# Patient Record
Sex: MALE | Race: WHITE | URBAN - METROPOLITAN AREA
[De-identification: names, ages, dates, MRNs, and addresses within clinical notes are randomized per-mention and may not be internally consistent; named-entity substitution may affect disease eponyms.]

---

## 2017-02-23 ENCOUNTER — OUTPATIENT (OUTPATIENT)
Dept: OUTPATIENT SERVICES | Facility: HOSPITAL | Age: 32
LOS: 1 days | Discharge: HOME | End: 2017-02-23

## 2017-08-23 DIAGNOSIS — F11.20 OPIOID DEPENDENCE, UNCOMPLICATED: ICD-10-CM

## 2017-10-02 ENCOUNTER — APPOINTMENT (OUTPATIENT)
Dept: INTERNAL MEDICINE | Facility: HOSPITAL | Age: 32
End: 2017-10-02

## 2017-10-02 PROBLEM — Z00.00 ENCOUNTER FOR PREVENTIVE HEALTH EXAMINATION: Status: ACTIVE | Noted: 2017-10-02

## 2018-01-11 ENCOUNTER — OUTPATIENT (OUTPATIENT)
Dept: OUTPATIENT SERVICES | Facility: HOSPITAL | Age: 33
LOS: 1 days | Discharge: HOME | End: 2018-01-11

## 2018-01-11 DIAGNOSIS — F11.20 OPIOID DEPENDENCE, UNCOMPLICATED: ICD-10-CM

## 2018-03-12 ENCOUNTER — OUTPATIENT (OUTPATIENT)
Dept: OUTPATIENT SERVICES | Facility: HOSPITAL | Age: 33
LOS: 1 days | Discharge: HOME | End: 2018-03-12

## 2018-03-12 DIAGNOSIS — Z00.8 ENCOUNTER FOR OTHER GENERAL EXAMINATION: ICD-10-CM

## 2018-03-12 DIAGNOSIS — I49.9 CARDIAC ARRHYTHMIA, UNSPECIFIED: ICD-10-CM

## 2018-03-12 LAB
ALBUMIN SERPL ELPH-MCNC: 4.1 G/DL — SIGNIFICANT CHANGE UP (ref 3–5.5)
ALP SERPL-CCNC: 53 U/L — SIGNIFICANT CHANGE UP (ref 30–115)
ALT FLD-CCNC: 15 U/L — SIGNIFICANT CHANGE UP (ref 0–41)
ANION GAP SERPL CALC-SCNC: 7 MMOL/L — SIGNIFICANT CHANGE UP (ref 7–14)
APPEARANCE UR: CLEAR — SIGNIFICANT CHANGE UP
AST SERPL-CCNC: 18 U/L — SIGNIFICANT CHANGE UP (ref 0–41)
BILIRUB DIRECT SERPL-MCNC: <0.1 MG/DL — SIGNIFICANT CHANGE UP (ref 0–0.2)
BILIRUB INDIRECT FLD-MCNC: >0.3 MG/DL — SIGNIFICANT CHANGE UP
BILIRUB SERPL-MCNC: 0.4 MG/DL — SIGNIFICANT CHANGE UP (ref 0.2–1.2)
BILIRUB UR-MCNC: NEGATIVE — SIGNIFICANT CHANGE UP
BUN SERPL-MCNC: 17 MG/DL — SIGNIFICANT CHANGE UP (ref 10–20)
CALCIUM SERPL-MCNC: 9.5 MG/DL — SIGNIFICANT CHANGE UP (ref 8.5–10.1)
CHLORIDE SERPL-SCNC: 103 MMOL/L — SIGNIFICANT CHANGE UP (ref 98–110)
CHOLEST SERPL-MCNC: 134 MG/DL — SIGNIFICANT CHANGE UP (ref 100–200)
CO2 SERPL-SCNC: 30 MMOL/L — SIGNIFICANT CHANGE UP (ref 17–32)
COLOR SPEC: YELLOW — SIGNIFICANT CHANGE UP
COMMENT - URINE: SIGNIFICANT CHANGE UP
CREAT SERPL-MCNC: 0.6 MG/DL — LOW (ref 0.7–1.5)
DIFF PNL FLD: NEGATIVE — SIGNIFICANT CHANGE UP
EPI CELLS # UR: (no result) /HPF
ESTIMATED AVERAGE GLUCOSE: 105 MG/DL — SIGNIFICANT CHANGE UP (ref 68–114)
GLUCOSE SERPL-MCNC: 78 MG/DL — SIGNIFICANT CHANGE UP (ref 70–110)
GLUCOSE UR QL: NEGATIVE MG/DL — SIGNIFICANT CHANGE UP
HBA1C BLD-MCNC: 5.3 % — SIGNIFICANT CHANGE UP (ref 4–5.6)
HCT VFR BLD CALC: 37.2 % — LOW (ref 42–52)
HDLC SERPL-MCNC: 38 MG/DL — LOW (ref 40–60)
HGB BLD-MCNC: 12.4 G/DL — LOW (ref 14–18)
KETONES UR-MCNC: NEGATIVE — SIGNIFICANT CHANGE UP
LEUKOCYTE ESTERASE UR-ACNC: NEGATIVE — SIGNIFICANT CHANGE UP
LIPID PNL WITH DIRECT LDL SERPL: 95 MG/DL — SIGNIFICANT CHANGE UP (ref 50–100)
MAGNESIUM SERPL-MCNC: 1.9 MG/DL — SIGNIFICANT CHANGE UP (ref 1.8–2.4)
MCHC RBC-ENTMCNC: 31.1 PG — HIGH (ref 27–31)
MCHC RBC-ENTMCNC: 33.3 G/DL — SIGNIFICANT CHANGE UP (ref 32–37)
MCV RBC AUTO: 93.2 FL — SIGNIFICANT CHANGE UP (ref 80–94)
NITRITE UR-MCNC: NEGATIVE — SIGNIFICANT CHANGE UP
NRBC # BLD: 0 /100 WBCS — SIGNIFICANT CHANGE UP (ref 0–0)
PH UR: 5.5 — SIGNIFICANT CHANGE UP (ref 5–8)
PLATELET # BLD AUTO: 244 K/UL — SIGNIFICANT CHANGE UP (ref 130–400)
POTASSIUM SERPL-MCNC: 4.4 MMOL/L — SIGNIFICANT CHANGE UP (ref 3.5–5)
POTASSIUM SERPL-SCNC: 4.4 MMOL/L — SIGNIFICANT CHANGE UP (ref 3.5–5)
PROT SERPL-MCNC: 7.1 G/DL — SIGNIFICANT CHANGE UP (ref 6–8)
PROT UR-MCNC: 30 MG/DL
RBC # BLD: 3.99 M/UL — LOW (ref 4.7–6.1)
RBC # FLD: 13.3 % — SIGNIFICANT CHANGE UP (ref 11.5–14.5)
RBC CASTS # UR COMP ASSIST: SIGNIFICANT CHANGE UP /HPF
SODIUM SERPL-SCNC: 140 MMOL/L — SIGNIFICANT CHANGE UP (ref 135–146)
SP GR SPEC: >=1.03 (ref 1.01–1.03)
TOTAL CHOLESTEROL/HDL RATIO MEASUREMENT: 3.5 RATIO — LOW (ref 4–5.5)
TRIGL SERPL-MCNC: 82 MG/DL — SIGNIFICANT CHANGE UP (ref 40–150)
UROBILINOGEN FLD QL: 0.2 MG/DL — SIGNIFICANT CHANGE UP (ref 0.2–0.2)
WBC # BLD: 7.62 K/UL — SIGNIFICANT CHANGE UP (ref 4.8–10.8)
WBC # FLD AUTO: 7.62 K/UL — SIGNIFICANT CHANGE UP (ref 4.8–10.8)
WBC UR QL: SIGNIFICANT CHANGE UP /HPF

## 2018-03-13 LAB
HAV IGM SER-ACNC: SIGNIFICANT CHANGE UP
HBV CORE IGM SER-ACNC: SIGNIFICANT CHANGE UP
HBV SURFACE AG SER-ACNC: SIGNIFICANT CHANGE UP
HCV AB S/CO SERPL IA: 0.2 S/CO — SIGNIFICANT CHANGE UP
HCV AB SERPL-IMP: SIGNIFICANT CHANGE UP
T PALLIDUM AB TITR SER: NEGATIVE — SIGNIFICANT CHANGE UP

## 2018-04-19 ENCOUNTER — OUTPATIENT (OUTPATIENT)
Dept: OUTPATIENT SERVICES | Facility: HOSPITAL | Age: 33
LOS: 1 days | Discharge: HOME | End: 2018-04-19

## 2018-04-19 DIAGNOSIS — I49.9 CARDIAC ARRHYTHMIA, UNSPECIFIED: ICD-10-CM

## 2018-06-03 ENCOUNTER — INPATIENT (INPATIENT)
Facility: HOSPITAL | Age: 33
LOS: 2 days | Discharge: HOME | End: 2018-06-06
Attending: HOSPITALIST | Admitting: HOSPITALIST

## 2018-06-03 VITALS
RESPIRATION RATE: 17 BRPM | TEMPERATURE: 100 F | DIASTOLIC BLOOD PRESSURE: 72 MMHG | SYSTOLIC BLOOD PRESSURE: 115 MMHG | WEIGHT: 139.99 LBS | HEART RATE: 87 BPM | OXYGEN SATURATION: 95 % | HEIGHT: 66 IN

## 2018-06-03 LAB
ALBUMIN SERPL ELPH-MCNC: 4.5 G/DL — SIGNIFICANT CHANGE UP (ref 3.5–5.2)
ALP SERPL-CCNC: 116 U/L — HIGH (ref 30–115)
ALT FLD-CCNC: 267 U/L — HIGH (ref 0–41)
ANION GAP SERPL CALC-SCNC: 17 MMOL/L — HIGH (ref 7–14)
APAP SERPL-MCNC: <5 UG/ML — LOW (ref 10–30)
AST SERPL-CCNC: 565 U/L — HIGH (ref 0–41)
BASOPHILS # BLD AUTO: 0.02 K/UL — SIGNIFICANT CHANGE UP (ref 0–0.2)
BASOPHILS NFR BLD AUTO: 0.2 % — SIGNIFICANT CHANGE UP (ref 0–1)
BILIRUB DIRECT SERPL-MCNC: 0.3 MG/DL — HIGH (ref 0–0.2)
BILIRUB INDIRECT FLD-MCNC: 0.6 MG/DL — SIGNIFICANT CHANGE UP (ref 0.2–1.2)
BILIRUB SERPL-MCNC: 0.9 MG/DL — SIGNIFICANT CHANGE UP (ref 0.2–1.2)
BUN SERPL-MCNC: 19 MG/DL — SIGNIFICANT CHANGE UP (ref 10–20)
CALCIUM SERPL-MCNC: 9.4 MG/DL — SIGNIFICANT CHANGE UP (ref 8.5–10.1)
CHLORIDE SERPL-SCNC: 101 MMOL/L — SIGNIFICANT CHANGE UP (ref 98–110)
CK MB CFR SERPL CALC: 2.7 NG/ML — SIGNIFICANT CHANGE UP (ref 0.6–6.3)
CK SERPL-CCNC: 713 U/L — HIGH (ref 0–225)
CO2 SERPL-SCNC: 23 MMOL/L — SIGNIFICANT CHANGE UP (ref 17–32)
CREAT SERPL-MCNC: 0.9 MG/DL — SIGNIFICANT CHANGE UP (ref 0.7–1.5)
D DIMER BLD IA.RAPID-MCNC: 4789 NG/ML DDU — HIGH (ref 0–230)
EOSINOPHIL # BLD AUTO: 0.01 K/UL — SIGNIFICANT CHANGE UP (ref 0–0.7)
EOSINOPHIL NFR BLD AUTO: 0.1 % — SIGNIFICANT CHANGE UP (ref 0–8)
GLUCOSE SERPL-MCNC: 82 MG/DL — SIGNIFICANT CHANGE UP (ref 70–99)
HCT VFR BLD CALC: 38.4 % — LOW (ref 42–52)
HGB BLD-MCNC: 13 G/DL — LOW (ref 14–18)
IMM GRANULOCYTES NFR BLD AUTO: 0.2 % — SIGNIFICANT CHANGE UP (ref 0.1–0.3)
LYMPHOCYTES # BLD AUTO: 0.27 K/UL — LOW (ref 1.2–3.4)
LYMPHOCYTES # BLD AUTO: 3.1 % — LOW (ref 20.5–51.1)
MCHC RBC-ENTMCNC: 30.2 PG — SIGNIFICANT CHANGE UP (ref 27–31)
MCHC RBC-ENTMCNC: 33.9 G/DL — SIGNIFICANT CHANGE UP (ref 32–37)
MCV RBC AUTO: 89.1 FL — SIGNIFICANT CHANGE UP (ref 80–94)
MONOCYTES # BLD AUTO: 0.02 K/UL — LOW (ref 0.1–0.6)
MONOCYTES NFR BLD AUTO: 0.2 % — LOW (ref 1.7–9.3)
NEUTROPHILS # BLD AUTO: 8.26 K/UL — HIGH (ref 1.4–6.5)
NEUTROPHILS NFR BLD AUTO: 96.2 % — HIGH (ref 42.2–75.2)
NRBC # BLD: 0 /100 WBCS — SIGNIFICANT CHANGE UP (ref 0–0)
PLATELET # BLD AUTO: 213 K/UL — SIGNIFICANT CHANGE UP (ref 130–400)
POTASSIUM SERPL-MCNC: 4.8 MMOL/L — SIGNIFICANT CHANGE UP (ref 3.5–5)
POTASSIUM SERPL-SCNC: 4.8 MMOL/L — SIGNIFICANT CHANGE UP (ref 3.5–5)
PROT SERPL-MCNC: 7.3 G/DL — SIGNIFICANT CHANGE UP (ref 6–8)
RBC # BLD: 4.31 M/UL — LOW (ref 4.7–6.1)
RBC # FLD: 13 % — SIGNIFICANT CHANGE UP (ref 11.5–14.5)
SODIUM SERPL-SCNC: 141 MMOL/L — SIGNIFICANT CHANGE UP (ref 135–146)
TROPONIN T SERPL-MCNC: <0.01 NG/ML — SIGNIFICANT CHANGE UP
WBC # BLD: 8.6 K/UL — SIGNIFICANT CHANGE UP (ref 4.8–10.8)
WBC # FLD AUTO: 8.6 K/UL — SIGNIFICANT CHANGE UP (ref 4.8–10.8)

## 2018-06-03 RX ORDER — SODIUM CHLORIDE 9 MG/ML
1000 INJECTION INTRAMUSCULAR; INTRAVENOUS; SUBCUTANEOUS ONCE
Qty: 0 | Refills: 0 | Status: COMPLETED | OUTPATIENT
Start: 2018-06-03 | End: 2018-06-03

## 2018-06-03 RX ORDER — SODIUM CHLORIDE 9 MG/ML
1000 INJECTION INTRAMUSCULAR; INTRAVENOUS; SUBCUTANEOUS
Qty: 0 | Refills: 0 | Status: COMPLETED | OUTPATIENT
Start: 2018-06-03 | End: 2018-06-04

## 2018-06-03 RX ORDER — METOCLOPRAMIDE HCL 10 MG
10 TABLET ORAL ONCE
Qty: 0 | Refills: 0 | Status: COMPLETED | OUTPATIENT
Start: 2018-06-03 | End: 2018-06-03

## 2018-06-03 RX ORDER — ACETAMINOPHEN 500 MG
975 TABLET ORAL ONCE
Qty: 0 | Refills: 0 | Status: COMPLETED | OUTPATIENT
Start: 2018-06-03 | End: 2018-06-03

## 2018-06-03 RX ORDER — METOCLOPRAMIDE HCL 10 MG
10 TABLET ORAL ONCE
Qty: 0 | Refills: 0 | Status: DISCONTINUED | OUTPATIENT
Start: 2018-06-03 | End: 2018-06-03

## 2018-06-03 RX ORDER — SODIUM CHLORIDE 9 MG/ML
2000 INJECTION INTRAMUSCULAR; INTRAVENOUS; SUBCUTANEOUS ONCE
Qty: 0 | Refills: 0 | Status: DISCONTINUED | OUTPATIENT
Start: 2018-06-03 | End: 2018-06-06

## 2018-06-03 RX ADMIN — Medication 10 MILLIGRAM(S): at 21:19

## 2018-06-03 RX ADMIN — SODIUM CHLORIDE 1000 MILLILITER(S): 9 INJECTION INTRAMUSCULAR; INTRAVENOUS; SUBCUTANEOUS at 22:49

## 2018-06-03 RX ADMIN — Medication 975 MILLIGRAM(S): at 21:20

## 2018-06-03 NOTE — ED PROVIDER NOTE - PHYSICAL EXAMINATION
VITAL SIGNS: I have reviewed nursing notes and confirm.  CONSTITUTIONAL: Well-developed; well-nourished; in no acute distress.  SKIN: Skin exam is warm and dry, no acute rash.  HEAD: Normocephalic; atraumatic.  EYES: PERRL, EOM intact; conjunctiva and sclera clear.  ENT: No nasal discharge; airway clear.   NECK: Supple; non tender.  CARD: S1, S2 normal; no murmurs, gallops, or rubs. Regular rate and rhythm. (+) mild left lower lateral rib TTP.   RESP: No wheezes, rales or rhonchi. Speaking in full sentences.   ABD: Normal bowel sounds; soft; non-distended; non-tender; No rebound or guarding. No CVA tenderness.   EXT: Normal ROM. No clubbing, cyanosis or edema.  NEURO: Alert, oriented. Grossly unremarkable. No focal deficits. CN II-XII intact. No dysmetria. No ataxia. Sensation intact throughout. Strength 5/5 throughout. Gait steady.

## 2018-06-03 NOTE — ED PROVIDER NOTE - CARE PLAN
Principal Discharge DX:	Transaminitis  Secondary Diagnosis:	Headache  Secondary Diagnosis:	Chest pain

## 2018-06-03 NOTE — ED PROVIDER NOTE - OBJECTIVE STATEMENT
34 yo M with PMHx of opioid abuse on methadone presents to the ED c/o headache and left sided chest pain. Headache started today around 4pm while pt was at rest and has been persisting. Pt tried taking Advil without relief of symptoms. Pt denies hx of similar headaches in the past. Pt also complains of intermittent left sided chest pain x 1 week associated with mild SOB. Pt denies hx of similar symptoms in the past. Pt denies fever, chills, nausea, vomiting, abdominal pain, diarrhea, headache, dizziness, weakness, changes in vision, back pain, LOC, trauma, urinary symptoms, cough, calf pain/swelling, recent travel, recent surgery.

## 2018-06-03 NOTE — ED PROVIDER NOTE - ATTENDING CONTRIBUTION TO CARE
Pt is a 34yo male who comes in for 3d of L chest pain and SOB intermittently for 3d.  No cough.  Reports some headache today with diffuse myalgias.  No vomiting or diarrhea.    Exam: L chest wall and LUQ soreness, soft abdmoen, clear lungs, no LE edema, no calf tenderness, no murmur  Imp: r/o ACS, PE, PNA  Plan: labs, iamging, IVF

## 2018-06-03 NOTE — H&P ADULT - NSHPLABSRESULTS_GEN_ALL_CORE
< from: CT Head No Cont (06.03.18 @ 20:58) >      EXAM:  CT BRAIN            PROCEDURE DATE:  06/03/2018      Impression:     No evidence of acute intracranial pathology.    RACHID KHANNA M.D., RESIDENT RADIOLOGIST  This document has been electronically signed.  ALEENA WEST M.D., ATTENDING RADIOLOGIST  This document has been electronically signed. Zach  3 2018  9:19PM      < end of copied text >    < from: CT Chest w/ IV Cont (06.03.18 @ 20:59) >    EXAM:  CT CHEST IC          PROCEDURE DATE:  06/03/2018      IMPRESSION:     No pulmonary embolus.    Dr. Rachid Khanna discussed preliminary findings with LISETH NINA   on 6/3/2018 9:14 PM with readback.    Few areas of peribronchiolar faint groundglass opacification in bilateral   lower lobes which may reflect small airways infection or inflammation. No   focal consolidation or focal infiltrate.      RACHID KHANNA M.D., RESIDENT RADIOLOGIST  This document has been electronically signed.  ALEENA WEST M.D., ATTENDING RADIOLOGIST  This document has been electronically signed. Zach  3 2018  9:28PM      < end of copied text >                          13.0   8.60  )-----------( 213      ( 03 Jun 2018 20:31 )             38.4     06-03    141  |  101  |  19  ----------------------------<  82  4.8   |  23  |  0.9    Ca    9.4      03 Jun 2018 20:31    TPro  7.3  /  Alb  4.5  /  TBili  0.9  /  DBili  0.3<H>  /  AST  565<H>  /  ALT  267<H>  /  AlkPhos  116<H>  06-03              Lactate Trend    CARDIAC MARKERS ( 03 Jun 2018 20:31 )  x     / <0.01 ng/mL / 713 U/L / x     / 2.7 ng/mL      CAPILLARY BLOOD GLUCOSE

## 2018-06-03 NOTE — H&P ADULT - HISTORY OF PRESENT ILLNESS
[age] yo male presents to the ER due to "flu-like" symptoms. Patient reports that in fact he has had intermittent chest pains for 2 weeks. However today at work he developed a "migraine" headache that he reports "crippled me" The headache is now "much better" however he then developed body aches, soreness and a  pale complexion and in general felt as if a "Aleksandr-truck" hit him (but no fevers) so he came to the ER. While in the ER Patient's blood pressure was low but he was again feeling better-he received iv Fluid bolus. He is urinating and there were no mental status changes-I saw him there. As a result he was advised by staff that if he left the building (ie to go outside to smoke) it would be "against medical advise" and he would be responsible if something happens to him outside, I agreed. Separately during my exam I found 2 syringe needles in his pocket. Upon my request he went to the needle container in his room to throw them out. Patient reports that while at [facility] sometime before 2016, he was told he didn't need the hepatitis vaccine because he was already exposed to Hepatitis B.

## 2018-06-03 NOTE — H&P ADULT - PROBLEM SELECTOR PLAN 1
concerned about viral hepatitis (history of hepatitis B exposure)-will order hepatitis panel along with liver sonogram and GI consult

## 2018-06-04 DIAGNOSIS — R10.13 EPIGASTRIC PAIN: ICD-10-CM

## 2018-06-04 DIAGNOSIS — R74.0 NONSPECIFIC ELEVATION OF LEVELS OF TRANSAMINASE AND LACTIC ACID DEHYDROGENASE [LDH]: ICD-10-CM

## 2018-06-04 DIAGNOSIS — G89.4 CHRONIC PAIN SYNDROME: ICD-10-CM

## 2018-06-04 DIAGNOSIS — F19.10 OTHER PSYCHOACTIVE SUBSTANCE ABUSE, UNCOMPLICATED: ICD-10-CM

## 2018-06-04 DIAGNOSIS — R51 HEADACHE: ICD-10-CM

## 2018-06-04 DIAGNOSIS — I95.9 HYPOTENSION, UNSPECIFIED: ICD-10-CM

## 2018-06-04 DIAGNOSIS — Z71.6 TOBACCO ABUSE COUNSELING: ICD-10-CM

## 2018-06-04 LAB
ALBUMIN SERPL ELPH-MCNC: 3.3 G/DL — LOW (ref 3.5–5.2)
ALP SERPL-CCNC: 74 U/L — SIGNIFICANT CHANGE UP (ref 30–115)
ALT FLD-CCNC: 206 U/L — HIGH (ref 0–41)
ANION GAP SERPL CALC-SCNC: 15 MMOL/L — HIGH (ref 7–14)
APTT BLD: 29.7 SEC — SIGNIFICANT CHANGE UP (ref 27–39.2)
AST SERPL-CCNC: 176 U/L — HIGH (ref 0–41)
BASE EXCESS BLDV CALC-SCNC: 0.6 MMOL/L — SIGNIFICANT CHANGE UP (ref -2–2)
BILIRUB SERPL-MCNC: 0.2 MG/DL — SIGNIFICANT CHANGE UP (ref 0.2–1.2)
BUN SERPL-MCNC: 20 MG/DL — SIGNIFICANT CHANGE UP (ref 10–20)
CA-I SERPL-SCNC: 1.15 MMOL/L — SIGNIFICANT CHANGE UP (ref 1.12–1.3)
CALCIUM SERPL-MCNC: 7.8 MG/DL — LOW (ref 8.5–10.1)
CHLORIDE SERPL-SCNC: 103 MMOL/L — SIGNIFICANT CHANGE UP (ref 98–110)
CK SERPL-CCNC: 128 U/L — SIGNIFICANT CHANGE UP (ref 0–225)
CO2 SERPL-SCNC: 22 MMOL/L — SIGNIFICANT CHANGE UP (ref 17–32)
CREAT SERPL-MCNC: 0.8 MG/DL — SIGNIFICANT CHANGE UP (ref 0.7–1.5)
GAS PNL BLDV: 134 MMOL/L — LOW (ref 136–145)
GAS PNL BLDV: SIGNIFICANT CHANGE UP
GLUCOSE SERPL-MCNC: 94 MG/DL — SIGNIFICANT CHANGE UP (ref 70–99)
HCO3 BLDV-SCNC: 27 MMOL/L — SIGNIFICANT CHANGE UP (ref 22–29)
HCT VFR BLDA CALC: 71.1 % — HIGH (ref 34–44)
HGB BLD CALC-MCNC: 23.2 G/DL — CRITICAL HIGH (ref 14–18)
HOROWITZ INDEX BLDV+IHG-RTO: 21 — SIGNIFICANT CHANGE UP
INR BLD: 1.41 RATIO — HIGH (ref 0.65–1.3)
LACTATE BLDV-MCNC: 1.5 MMOL/L — SIGNIFICANT CHANGE UP (ref 0.5–1.6)
PCO2 BLDV: 48 MMHG — SIGNIFICANT CHANGE UP (ref 41–51)
PH BLDV: 7.36 — SIGNIFICANT CHANGE UP (ref 7.26–7.43)
PO2 BLDV: 24 MMHG — SIGNIFICANT CHANGE UP (ref 20–40)
POTASSIUM BLDV-SCNC: 4.3 MMOL/L — SIGNIFICANT CHANGE UP (ref 3.3–5.6)
POTASSIUM SERPL-MCNC: 4.1 MMOL/L — SIGNIFICANT CHANGE UP (ref 3.5–5)
POTASSIUM SERPL-SCNC: 4.1 MMOL/L — SIGNIFICANT CHANGE UP (ref 3.5–5)
PROT SERPL-MCNC: 5.2 G/DL — LOW (ref 6–8)
PROTHROM AB SERPL-ACNC: 15.3 SEC — HIGH (ref 9.95–12.87)
SAO2 % BLDV: 40 % — SIGNIFICANT CHANGE UP
SODIUM SERPL-SCNC: 140 MMOL/L — SIGNIFICANT CHANGE UP (ref 135–146)

## 2018-06-04 RX ORDER — METHADONE HYDROCHLORIDE 40 MG/1
70 TABLET ORAL DAILY
Qty: 0 | Refills: 0 | Status: DISCONTINUED | OUTPATIENT
Start: 2018-06-04 | End: 2018-06-04

## 2018-06-04 RX ORDER — METHADONE HYDROCHLORIDE 40 MG/1
70 TABLET ORAL
Qty: 0 | Refills: 0 | Status: DISCONTINUED | OUTPATIENT
Start: 2018-06-04 | End: 2018-06-05

## 2018-06-04 RX ORDER — PANTOPRAZOLE SODIUM 20 MG/1
40 TABLET, DELAYED RELEASE ORAL
Qty: 0 | Refills: 0 | Status: DISCONTINUED | OUTPATIENT
Start: 2018-06-04 | End: 2018-06-06

## 2018-06-04 RX ORDER — METHOCARBAMOL 500 MG/1
750 TABLET, FILM COATED ORAL
Qty: 0 | Refills: 0 | Status: DISCONTINUED | OUTPATIENT
Start: 2018-06-04 | End: 2018-06-06

## 2018-06-04 RX ADMIN — SODIUM CHLORIDE 75 MILLILITER(S): 9 INJECTION INTRAMUSCULAR; INTRAVENOUS; SUBCUTANEOUS at 06:52

## 2018-06-04 RX ADMIN — PANTOPRAZOLE SODIUM 40 MILLIGRAM(S): 20 TABLET, DELAYED RELEASE ORAL at 15:45

## 2018-06-04 RX ADMIN — METHOCARBAMOL 750 MILLIGRAM(S): 500 TABLET, FILM COATED ORAL at 15:48

## 2018-06-04 NOTE — CONSULT NOTE ADULT - SUBJECTIVE AND OBJECTIVE BOX
Gastroenterology Consult    33y yo Male with abnormal liver functions.  HPI:  34 yo male presents to the ER due to "flu-like" symptoms. Patient reports that in fact he has had intermittent chest pains for 2 weeks. However today at work he developed a "migraine" headache that he reports "crippled me" The headache is now "much better" however he then developed body aches, soreness and a  pale complexion and in general felt as if a "Aleksandr-truck" hit him (but no fevers) so he came to the ER. While in the ER Patient's blood pressure was low but he was again feeling better-he received iv Fluid bolus. He is urinating and there were no mental status changes-I saw him there. As a result he was advised by staff that if he left the building (ie to go outside to smoke) it would be "against medical advise" and he would be responsible if something happens to him outside, I agreed. Separately during my exam I found 2 syringe needles in his pocket. Upon my request he went to the needle container in his room to throw them out. Patient reports that while at [facility] sometime before 2016, he was told he didn't need the hepatitis vaccine because he was already exposed to Hepatitis B. (03 Jun 2018 22:02)      PAST MEDICAL & SURGICAL HISTORY:  Substance abuse      Allergies; No Known Allergies      Medications: methadone   Dispersible 70 milliGRAM(s) Oral <User Schedule>  methocarbamol 750 milliGRAM(s) Oral two times a day  pantoprazole    Tablet 40 milliGRAM(s) Oral before breakfast  sodium chloride 0.9% Bolus 2000 milliLiter(s) IV Bolus once      Review of Systems: noncontributory, other than in inital H & P    Physical Examination:  T(C): 36.4 (06-04-18 @ 14:18), Max: 37.5 (06-03-18 @ 19:42)  HR: 86 (06-04-18 @ 14:18) (86 - 102)  BP: 99/57 (06-04-18 @ 14:18) (89/55 - 115/72)  RR: 18 (06-04-18 @ 14:18) (17 - 20)  SpO2: 99% (06-04-18 @ 00:51) (95% - 99%)  GENERAL:  Appears stated age, well-groomed, well-nourished, no distress  HEENT:  NC/AT,  conjunctivae clear and pink, no thyromegaly, nodules, adenopathy, no JVD, sclera -anicteric  CHEST:  Full & symmetric excursion, no increased effort, breath sounds clear  HEART:  Regular rhythm, S1, S2, no murmur/rub/S3/S4, no abdominal bruit, no edema  ABDOMEN:  Soft, non-tender, non-distended, normoactive bowel sounds,  no masses ,no hepato-splenomegaly, no signs of chronic liver disease: liver is enlarged  EXTREMITIES:  no cyanosis,clubbing or edema  SKIN:  No rash/erythema/ecchymoses/petechiae/wounds/abscess/warm/dry  NEURO:  Alert, oriented, no asterixis, no tremor, no encephalopathy

## 2018-06-04 NOTE — PROGRESS NOTE ADULT - PROBLEM SELECTOR PLAN 1
check hepatitis panel; GI consult pending; avoid hepatotoxic meds ; pt was consulted regarding Tylenol overdose

## 2018-06-04 NOTE — PROGRESS NOTE ADULT - ASSESSMENT
34 yo male presents to the ER due to "flu-like" symptoms. Patient reports that in fact he has had intermittent chest pains for 2 weeks. However today at work he developed a "migraine" headache that he reports "crippled me" The headache is now "much better" however he then developed body aches, soreness and a  pale complexion and in general felt as if a "Aleksandr-truck" hit him (but no fevers) so he came to the ER. While in the ER Patient's blood pressure was low but he was again feeling better-he received iv Fluid bolus.   Pt is on Methadone for h/o drug abuse and chronic pain. He usually takes 4 Tylenol pill when he is in pain ( 500 mg each)  I educated him regarding possible overdose. Today he left the hospital and went to Methadone clinic ( he took  Methadone from with clinic and came back to the hospital ). Will check hepatitis panel and f/u GI recommendations.   Anticipate discharge in AM

## 2018-06-04 NOTE — PROVIDER CONTACT NOTE (MEDICATION) - SITUATION
Pt was TASHI, called Methadone center and pt was there 10 minutes prior to call and was medicated by there staff. They are now aware pt in inpatient status. Pt also removed self from IV fluids,

## 2018-06-04 NOTE — CONSULT NOTE ADULT - ASSESSMENT
Impression: 33y yo Male with abnormal liver functions.  Differential includes viral hepatitis or toxins related to IV drug abuse.  He notes recent IV drug use.  Recommendation:  Check Hep A Ab, Hep A Igm Hep B SAg, , Hep C Ab, Hep C RNA by PCR, Hep B viral load,LLOYD, Smooth Muscle antibody, LLOYD, Ceruloplasmin, Ferritin, Transferrin Saturation, SPEP, EBV panel  Obtain Ultrasound of Abdomen  Follow CMP and INR

## 2018-06-04 NOTE — PROGRESS NOTE ADULT - SUBJECTIVE AND OBJECTIVE BOX
34 yo male presents to the ER due to "flu-like" symptoms. Patient reports that in fact he has had intermittent chest pains for 2 weeks. However today at work he developed a "migraine" headache that he reports "crippled me" The headache is now "much better" however he then developed body aches, soreness and a  pale complexion and in general felt as if a "Aleksandr-truck" hit him (but no fevers) so he came to the ER. While in the ER Patient's blood pressure was low but he was again feeling better-he received iv Fluid bolus. Pt is on Methadone for h/o drug abuse and chronic pain. He usually takes 4 Tylenol pill when he is in pain ( 500 mg each)  I educated him regarding possible overdose. Today he left the hospital and went to Methadone clinic ( he took  Methadone from with clinic and came back to the hospital )  He is c/o epigastric pain today and chronic neck and lower back pain.     Vital Signs Last 24 Hrs  T(C): 36.7 (04 Jun 2018 02:13), Max: 37.5 (03 Jun 2018 19:42)  T(F): 98.1 (04 Jun 2018 02:13), Max: 99.5 (03 Jun 2018 19:42)  HR: 92 (04 Jun 2018 02:13) (87 - 102)  BP: 99/60 (04 Jun 2018 02:13) (89/55 - 115/72)  -RR: 18 (04 Jun 2018 02:13) (17 - 20)  SpO2: 99% (04 Jun 2018 00:51) (95% - 99%)  PHYSICAL EXAM:  GENERAL: NAD, well-groomed, well-developed  HEAD:  Atraumatic, Normocephalic  EYES: EOMI, PERRLA, conjunctiva and sclera clear  ENMT: No tonsillar erythema, exudates, or enlargement; Moist mucous membranes, Good dentition, No lesions  NECK: Supple, No JVD, Normal thyroid  NERVOUS SYSTEM:  Alert & Oriented X3, Good concentration; Motor Strength 5/5 B/L upper and lower extremities; DTRs 2+ intact and symmetric  CHEST/LUNG: Clear to percussion bilaterally; No rales, rhonchi, wheezing, or rubs  HEART: Regular rate and rhythm; No murmurs, rubs, or gallops  ABDOMEN: soft; epigastric tenderness noted on deep palpation; no rebound; no guarding; BS present   EXTREMITIES:  2+ Peripheral Pulses, No clubbing, cyanosis, or edema  LYMPH: No lymphadenopathy noted  SKIN: No rashes or lesions  LABS:                        13.0   8.60  )-----------( 213      ( 03 Jun 2018 20:31 )             38.4     06-04    140  |  103  |  20  ----------------------------<  94  4.1   |  22  |  0.8    Ca    7.8<L>      04 Jun 2018 07:29    TPro  5.2<L>  /  Alb  3.3<L>  /  TBili  0.2  /  DBili  x   /  AST  176<H>  /  ALT  206<H>  /  AlkPhos  74  06-04    PT/INR - ( 04 Jun 2018 07:29 )   PT: 15.30 sec;   INR: 1.41 ratio         PTT - ( 04 Jun 2018 07:29 )  PTT:29.7 sec    RADIOLOGY & ADDITIONAL TESTS:  < from: US Abdomen Limited (06.04.18 @ 10:03) >  IMPRESSION:  Unremarkable right upper quadrant ultrasound.  Gallbladder polyp, 0.4 cm.    < from: CT Chest w/ IV Cont (06.03.18 @ 20:59) >  IMPRESSION:     No pulmonary embolus.    < from: CT Head No Cont (06.03.18 @ 20:58) >  Impression:     No evidence of acute intracranial pathology.    MEDICATIONS  (STANDING):  methadone   Dispersible 70 milliGRAM(s) Oral <User Schedule>  sodium chloride 0.9% Bolus 2000 milliLiter(s) IV Bolus once

## 2018-06-05 ENCOUNTER — TRANSCRIPTION ENCOUNTER (OUTPATIENT)
Age: 33
End: 2018-06-05

## 2018-06-05 DIAGNOSIS — D72.829 ELEVATED WHITE BLOOD CELL COUNT, UNSPECIFIED: ICD-10-CM

## 2018-06-05 LAB
ALBUMIN SERPL ELPH-MCNC: 3.9 G/DL — SIGNIFICANT CHANGE UP (ref 3.5–5.2)
ALP SERPL-CCNC: 125 U/L — HIGH (ref 30–115)
ALT FLD-CCNC: 147 U/L — HIGH (ref 0–41)
ANION GAP SERPL CALC-SCNC: 11 MMOL/L — SIGNIFICANT CHANGE UP (ref 7–14)
APPEARANCE UR: CLEAR — SIGNIFICANT CHANGE UP
AST SERPL-CCNC: 76 U/L — HIGH (ref 0–41)
BILIRUB SERPL-MCNC: 0.2 MG/DL — SIGNIFICANT CHANGE UP (ref 0.2–1.2)
BILIRUB UR-MCNC: NEGATIVE — SIGNIFICANT CHANGE UP
BUN SERPL-MCNC: 16 MG/DL — SIGNIFICANT CHANGE UP (ref 10–20)
CALCIUM SERPL-MCNC: 8.7 MG/DL — SIGNIFICANT CHANGE UP (ref 8.5–10.1)
CHLORIDE SERPL-SCNC: 103 MMOL/L — SIGNIFICANT CHANGE UP (ref 98–110)
CO2 SERPL-SCNC: 25 MMOL/L — SIGNIFICANT CHANGE UP (ref 17–32)
COLOR SPEC: YELLOW — SIGNIFICANT CHANGE UP
CREAT SERPL-MCNC: 0.9 MG/DL — SIGNIFICANT CHANGE UP (ref 0.7–1.5)
DIFF PNL FLD: NEGATIVE — SIGNIFICANT CHANGE UP
GLUCOSE SERPL-MCNC: 110 MG/DL — HIGH (ref 70–99)
GLUCOSE UR QL: NEGATIVE MG/DL — SIGNIFICANT CHANGE UP
HAV IGM SER-ACNC: SIGNIFICANT CHANGE UP
HAV IGM SER-ACNC: SIGNIFICANT CHANGE UP
HBV CORE IGM SER-ACNC: SIGNIFICANT CHANGE UP
HBV CORE IGM SER-ACNC: SIGNIFICANT CHANGE UP
HBV SURFACE AG SER-ACNC: SIGNIFICANT CHANGE UP
HBV SURFACE AG SER-ACNC: SIGNIFICANT CHANGE UP
HCT VFR BLD CALC: 32 % — LOW (ref 42–52)
HCT VFR BLD CALC: 34.5 % — LOW (ref 42–52)
HCV AB S/CO SERPL IA: 0.13 S/CO — SIGNIFICANT CHANGE UP
HCV AB S/CO SERPL IA: 0.16 S/CO — SIGNIFICANT CHANGE UP
HCV AB SERPL-IMP: SIGNIFICANT CHANGE UP
HCV AB SERPL-IMP: SIGNIFICANT CHANGE UP
HGB BLD-MCNC: 11 G/DL — LOW (ref 14–18)
HGB BLD-MCNC: 11.8 G/DL — LOW (ref 14–18)
KETONES UR-MCNC: NEGATIVE — SIGNIFICANT CHANGE UP
LEUKOCYTE ESTERASE UR-ACNC: NEGATIVE — SIGNIFICANT CHANGE UP
MAGNESIUM SERPL-MCNC: 2 MG/DL — SIGNIFICANT CHANGE UP (ref 1.8–2.4)
MCHC RBC-ENTMCNC: 30.6 PG — SIGNIFICANT CHANGE UP (ref 27–31)
MCHC RBC-ENTMCNC: 31.2 PG — HIGH (ref 27–31)
MCHC RBC-ENTMCNC: 34.2 G/DL — SIGNIFICANT CHANGE UP (ref 32–37)
MCHC RBC-ENTMCNC: 34.4 G/DL — SIGNIFICANT CHANGE UP (ref 32–37)
MCV RBC AUTO: 89.4 FL — SIGNIFICANT CHANGE UP (ref 80–94)
MCV RBC AUTO: 90.7 FL — SIGNIFICANT CHANGE UP (ref 80–94)
NITRITE UR-MCNC: NEGATIVE — SIGNIFICANT CHANGE UP
NRBC # BLD: 0 /100 WBCS — SIGNIFICANT CHANGE UP (ref 0–0)
NRBC # BLD: 0 /100 WBCS — SIGNIFICANT CHANGE UP (ref 0–0)
PH UR: 6.5 — SIGNIFICANT CHANGE UP (ref 5–8)
PLATELET # BLD AUTO: 156 K/UL — SIGNIFICANT CHANGE UP (ref 130–400)
PLATELET # BLD AUTO: 181 K/UL — SIGNIFICANT CHANGE UP (ref 130–400)
POTASSIUM SERPL-MCNC: 3.7 MMOL/L — SIGNIFICANT CHANGE UP (ref 3.5–5)
POTASSIUM SERPL-SCNC: 3.7 MMOL/L — SIGNIFICANT CHANGE UP (ref 3.5–5)
PROT SERPL-MCNC: 6 G/DL — SIGNIFICANT CHANGE UP (ref 6–8)
PROT UR-MCNC: NEGATIVE MG/DL — SIGNIFICANT CHANGE UP
RBC # BLD: 3.53 M/UL — LOW (ref 4.7–6.1)
RBC # BLD: 3.86 M/UL — LOW (ref 4.7–6.1)
RBC # FLD: 13.4 % — SIGNIFICANT CHANGE UP (ref 11.5–14.5)
RBC # FLD: 13.5 % — SIGNIFICANT CHANGE UP (ref 11.5–14.5)
SODIUM SERPL-SCNC: 139 MMOL/L — SIGNIFICANT CHANGE UP (ref 135–146)
SP GR SPEC: 1.02 — SIGNIFICANT CHANGE UP (ref 1.01–1.03)
UROBILINOGEN FLD QL: 1 MG/DL (ref 0.2–0.2)
WBC # BLD: 16.94 K/UL — HIGH (ref 4.8–10.8)
WBC # BLD: 17.94 K/UL — HIGH (ref 4.8–10.8)
WBC # FLD AUTO: 16.94 K/UL — HIGH (ref 4.8–10.8)
WBC # FLD AUTO: 17.94 K/UL — HIGH (ref 4.8–10.8)

## 2018-06-05 RX ORDER — METHADONE HYDROCHLORIDE 40 MG/1
70 TABLET ORAL DAILY
Qty: 0 | Refills: 0 | Status: DISCONTINUED | OUTPATIENT
Start: 2018-06-05 | End: 2018-06-06

## 2018-06-05 RX ORDER — METHOCARBAMOL 500 MG/1
1 TABLET, FILM COATED ORAL
Qty: 30 | Refills: 0
Start: 2018-06-05 | End: 2018-06-19

## 2018-06-05 RX ORDER — PANTOPRAZOLE SODIUM 20 MG/1
1 TABLET, DELAYED RELEASE ORAL
Qty: 30 | Refills: 0
Start: 2018-06-05 | End: 2018-07-04

## 2018-06-05 RX ORDER — METHADONE HYDROCHLORIDE 40 MG/1
7 TABLET ORAL
Qty: 0 | Refills: 0 | DISCHARGE
Start: 2018-06-05

## 2018-06-05 RX ADMIN — METHOCARBAMOL 750 MILLIGRAM(S): 500 TABLET, FILM COATED ORAL at 17:47

## 2018-06-05 RX ADMIN — METHOCARBAMOL 750 MILLIGRAM(S): 500 TABLET, FILM COATED ORAL at 05:48

## 2018-06-05 RX ADMIN — METHADONE HYDROCHLORIDE 70 MILLIGRAM(S): 40 TABLET ORAL at 12:05

## 2018-06-05 RX ADMIN — PANTOPRAZOLE SODIUM 40 MILLIGRAM(S): 20 TABLET, DELAYED RELEASE ORAL at 05:47

## 2018-06-05 NOTE — PROGRESS NOTE ADULT - ASSESSMENT
34 yo male presents to the ER due to "flu-like" symptoms. Patient reports that in fact he has had intermittent chest pains for 2 weeks. However today at work he developed a "migraine" headache that he reports "crippled me" The headache is now "much better" however he then developed body aches, soreness and a  pale complexion and in general felt as if a "Aleksandr-truck" hit him (but no fevers) so he came to the ER. While in the ER Patient's blood pressure was low but he was again feeling better-he received iv Fluid bolus.   Pt is on Methadone for h/o drug abuse and chronic pain. He usually takes 4 Tylenol pill when he is in pain ( 500 mg each)  I educated him regarding possible overdose.   Today his WBC count went up; will check UA; CT chest was done on admission ( no evidence of infectious process) ; will f/u repeat WBC at 3 pm

## 2018-06-05 NOTE — DISCHARGE NOTE ADULT - PLAN OF CARE
avoid hepatotoxic meds; monitor LFTs do not take more that 2 tabs of Tylenol; f/u with GI after discharge and repeat blood work take all meds as prescribed ;f/u in Methadone clinic; f/u with pain management f/u with PMD and repeat blood work at the time of next doctors visit DO NOT USE STREET DRUGS; f/u in Methadone clinic

## 2018-06-05 NOTE — DISCHARGE NOTE ADULT - CARE PLAN
Principal Discharge DX:	Transaminitis  Goal:	avoid hepatotoxic meds; monitor LFTs  Assessment and plan of treatment:	do not take more that 2 tabs of Tylenol; f/u with GI after discharge and repeat blood work  Secondary Diagnosis:	Chronic pain syndrome  Assessment and plan of treatment:	take all meds as prescribed ;f/u in Methadone clinic; f/u with pain management  Secondary Diagnosis:	Leukocytosis  Assessment and plan of treatment:	f/u with PMD and repeat blood work at the time of next doctors visit  Secondary Diagnosis:	Substance abuse  Assessment and plan of treatment:	DO NOT USE STREET DRUGS; f/u in Methadone clinic

## 2018-06-05 NOTE — DISCHARGE NOTE ADULT - CARE PROVIDERS DIRECT ADDRESSES
,DirectAddress_Unknown,[email or web address]

## 2018-06-05 NOTE — PROGRESS NOTE ADULT - SUBJECTIVE AND OBJECTIVE BOX
GI Followup Note:   Pt seen and examined at bedside.      seen  by GI  for :LFT abnormalities. No pain, no fever. No h/o alcohol intake. Has been taking Advil        Subjective:      REVIEW OF SYSTEMS:  Constitutional: No fever, weight loss or fatigue  Cardiovascular: No chest pain, palpitations, dizziness or leg swelling  Gastrointestinal: No abdominal or epigastric pain. No nausea, vomiting or hematemesis; No diarrhea or constipation. No melena or hematochezia.  Skin: No itching, burning, rashes or lesions     Allergies    No Known Allergies    Intolerances        MEDICATIONS  (STANDING):  methadone    Tablet 70 milliGRAM(s) Oral daily  methocarbamol 750 milliGRAM(s) Oral two times a day  pantoprazole    Tablet 40 milliGRAM(s) Oral before breakfast  sodium chloride 0.9% Bolus 2000 milliLiter(s) IV Bolus once    MEDICATIONS  (PRN):      Vital Signs Last 24 Hrs  T(C): 37.2 (05 Jun 2018 06:00), Max: 37.2 (05 Jun 2018 06:00)  T(F): 98.9 (05 Jun 2018 06:00), Max: 98.9 (05 Jun 2018 06:00)  HR: 76 (05 Jun 2018 06:00) (76 - 97)  BP: 103/57 (05 Jun 2018 06:00) (103/57 - 113/73)  BP(mean): --  RR: 18 (05 Jun 2018 06:00) (18 - 18)  SpO2: --      PHYSICAL EXAM:    General: Well developed; well nourished; in no acute distress  HEENT: MMM, conjunctiva and sclera clear  Lungs: Clear, no Rhonchi  Gastrointestinal: Soft non-tender non-distended; Normal bowel sounds; No hepatosplenomegaly. No rebound or guarding  Skin: Warm and dry. No obvious rash    LABS:  CBC Full  -  ( 05 Jun 2018 08:01 )  WBC Count : 16.94 K/uL  Hemoglobin : 11.0 g/dL  Hematocrit : 32.0 %  Platelet Count - Automated : 156 K/uL  Mean Cell Volume : 90.7 fL  Mean Cell Hemoglobin : 31.2 pg  Mean Cell Hemoglobin Concentration : 34.4 g/dL  Auto Neutrophil # : x  Auto Lymphocyte # : x  Auto Monocyte # : x  Auto Eosinophil # : x  Auto Basophil # : x  Auto Neutrophil % : x  Auto Lymphocyte % : x  Auto Monocyte % : x  Auto Eosinophil % : x  Auto Basophil % : x    06-05    139  |  103  |  16  ----------------------------<  110<H>  3.7   |  25  |  0.9    Ca    8.7      05 Jun 2018 08:01  Mg     2.0     06-05    TPro  6.0  /  Alb  3.9  /  TBili  0.2  /  DBili  x   /  AST  76<H>  /  ALT  147<H>  /  AlkPhos  125<H>  06-05    PT/INR - ( 04 Jun 2018 07:29 )   PT: 15.30 sec;   INR: 1.41 ratio         PTT - ( 04 Jun 2018 07:29 )  PTT:29.7 sec  Hep A, B, C negative  < from: US Abdomen Limited (06.04.18 @ 10:03) >    IMPRESSION:  Unremarkable right upper quadrant ultrasound.  Gallbladder polyp, 0.4 cm.      < end of copied text >                  RADIOLOGY & ADDITIONAL STUDIES:

## 2018-06-05 NOTE — DISCHARGE NOTE ADULT - MEDICATION SUMMARY - MEDICATIONS TO TAKE
I will START or STAY ON the medications listed below when I get home from the hospital:    Suboxone  -- Indication: For Substance abuse    methadone 10 mg oral tablet  -- 7 tab(s) by mouth once a day  -- Indication: For Chronic pain syndrome    methocarbamol 750 mg oral tablet  -- 1 tab(s) by mouth 2 times a day, As Needed   -- Indication: For Chronic pain syndrome    pantoprazole 40 mg oral delayed release tablet  -- 1 tab(s) by mouth once a day (before a meal)  -- Indication: For Epigastric abdominal pain

## 2018-06-05 NOTE — DISCHARGE NOTE ADULT - HOSPITAL COURSE
34 yo male presents to the ER due to "flu-like" symptoms. Patient reports that in fact he has had intermittent chest pains for 2 weeks. However today at work he developed a "migraine" headache that he reports "crippled me" The headache is now "much better" however he then developed body aches, soreness and a  pale complexion and in general felt as if a "Aleksandr-truck" hit him (but no fevers) so he came to the ER. While in the ER Patient's blood pressure was low but he was again feeling better-he received iv Fluid bolus. Pt is on Methadone for h/o drug abuse and chronic pain. He usually takes 4 Tylenol pill when he is in pain ( 500 mg each)  I educated him regarding possible overdose. While in the hospital pt was consulted by GI; LFT were trending down; abdominal sono showed small gallbladder polyp. Pt developed leukocytosis ; CT chest was negative for PNA; UA sent as well as WBC.   Pt clinically improved and was cleared for discharge home. He was instructed to f/u with PMD; GI and pain management. 32 yo male presents to the ER due to "flu-like" symptoms. Patient reports that in fact he has had intermittent chest pains for 2 weeks. However today at work he developed a "migraine" headache that he reports "crippled me" The headache is now "much better" however he then developed body aches, soreness and a  pale complexion and in general felt as if a "Aleksandr-truck" hit him (but no fevers) so he came to the ER. While in the ER Patient's blood pressure was low but he was again feeling better-he received iv Fluid bolus. Pt is on Methadone for h/o drug abuse and chronic pain. He usually takes 4 Tylenol pill when he is in pain ( 500 mg each)  I educated him regarding possible overdose. While in the hospital pt was consulted by GI; LFT were trending down; abdominal sono showed small gallbladder polyp. Pt developed leukocytosis ; CT chest was negative for PNA; UA was negative for infection. Repeat WBC WNL today. Pt was seen and examined at bedside today; he denies any complaints.   Pt clinically improved and was cleared for discharge home. He was instructed to f/u with PMD; GI and pain management.

## 2018-06-05 NOTE — DISCHARGE NOTE ADULT - CARE PROVIDER_API CALL
Reynaldo Null), Anesthesiology; Pain Medicine  [facility] [place][[address]  [address]  [place] [address]  Phone: [phone]  Fax: [phone]    Sukhdev Orteag), Gastroenterology; Internal Medicine  [address] [place] [address]  Phone: [phone]  Fax: [phone]

## 2018-06-05 NOTE — DISCHARGE NOTE ADULT - PATIENT PORTAL LINK FT
You can access the [facility] Patient Portal, offered by [employer], by registering with the following website: [email or web address]

## 2018-06-05 NOTE — PROGRESS NOTE ADULT - SUBJECTIVE AND OBJECTIVE BOX
32 yo male presents to the ER due to "flu-like" symptoms. Patient reports that in fact he has had intermittent chest pains for 2 weeks. However today at work he developed a "migraine" headache that he reports "crippled me" The headache is now "much better" however he then developed body aches, soreness and a  pale complexion and in general felt as if a "Aleksandr-truck" hit him (but no fevers) so he came to the ER. While in the ER Patient's blood pressure was low but he was again feeling better-he received iv Fluid bolus. Pt is on Methadone for h/o drug abuse and chronic pain. He usually takes 4 Tylenol pill when he is in pain ( 500 mg each)  I educated him regarding possible overdose. Today he left the hospital and went to Methadone clinic ( he took  Methadone from with clinic and came back to the hospital )  Pt is comfortable today; he denies any specific complaints today    Vital Signs Last 24 Hrs  T(C): 37.2 (05 Jun 2018 06:00), Max: 37.2 (05 Jun 2018 06:00)  T(F): 98.9 (05 Jun 2018 06:00), Max: 98.9 (05 Jun 2018 06:00)  HR: 76 (05 Jun 2018 06:00) (76 - 97)  BP: 103/57 (05 Jun 2018 06:00) (99/57 - 113/73)  RR: 18 (05 Jun 2018 06:00) (18 - 18)    PHYSICAL EXAM:  GENERAL: NAD, well-groomed, well-developed  HEAD:  Atraumatic, Normocephalic  EYES: EOMI, PERRLA, conjunctiva and sclera clear  ENMT: No tonsillar erythema, exudates, or enlargement; Moist mucous membranes, Good dentition, No lesions  NECK: Supple, No JVD, Normal thyroid  NERVOUS SYSTEM:  Alert & Oriented X3, Good concentration; Motor Strength 5/5 B/L upper and lower extremities; DTRs 2+ intact and symmetric  CHEST/LUNG: Clear to percussion bilaterally; No rales, rhonchi, wheezing, or rubs  HEART: Regular rate and rhythm; No murmurs, rubs, or gallops  ABDOMEN: soft; epigastric tenderness noted on deep palpation; no rebound; no guarding; BS present   EXTREMITIES:  2+ Peripheral Pulses, No clubbing, cyanosis, or edema  LYMPH: No lymphadenopathy noted  SKIN: No rashes or lesions  LABS:                                     11.0   16.94 )-----------( 156      ( 05 Jun 2018 08:01 )             32.0       139  |  103  |  16  ----------------------------<  110<H>  3.7   |  25  |  0.9    Ca    8.7      05 Jun 2018 08:01  Mg     2.0     06-05    TPro  6.0  /  Alb  3.9  /  TBili  0.2  /  DBili  x   /  AST  76<H>  /  ALT  147<H>  /  AlkPhos  125<H>  06-05    PT/INR - ( 04 Jun 2018 07:29 )   PT: 15.30 sec;   INR: 1.41 ratio         PTT - ( 04 Jun 2018 07:29 )  PTT:29.7 sec  Acute Hepatitis Panel (06.04.18 @ 07:29)    Hepatitis C Virus Interpretation: Nonreact: Hepatitis C AB  S/CO Ratio                        Interpretation  < 1.0                                     Non-Reactive  1.0 - 4.9                           Weakly-Reactive  > 5.0                                 Reactive  Non-Reactive: A person witha non-reactive HCV antibody result is  considered uninfected.  No further action is needed unless recent  infection is suspected.  In these cases, consider repeat testing later to  detect seroconversion..  Weakly-Reactive: HCV antibody test is abnormal, HCV RNA Qualitative test  will follow.  Reactive: HCV antibody test is abnormal, HCV RNA Qualitative test will  follow.  Note: HCV antibody testing is performed on the Abbott  system.    Hepatitis C Virus S/CO Ratio: 0.13 S/CO    Hepatitis B Core IgM Antibody: Nonreact    Hepatitis B Surface Antigen: Nonreact    Hepatitis A IgM Antibody: Nonreact      RADIOLOGY & ADDITIONAL TESTS:  < from: US Abdomen Limited (06.04.18 @ 10:03) >  IMPRESSION:  Unremarkable right upper quadrant ultrasound.  Gallbladder polyp, 0.4 cm.    < from: CT Chest w/ IV Cont (06.03.18 @ 20:59) >  IMPRESSION:     No pulmonary embolus.    < from: CT Head No Cont (06.03.18 @ 20:58) >  Impression:     No evidence of acute intracranial pathology.    MEDICATIONS  (STANDING):  methadone    Tablet 70 milliGRAM(s) Oral daily  methocarbamol 750 milliGRAM(s) Oral two times a day  pantoprazole    Tablet 40 milliGRAM(s) Oral before breakfast  sodium chloride 0.9% Bolus 2000 milliLiter(s) IV Bolus once

## 2018-06-05 NOTE — PROGRESS NOTE ADULT - ASSESSMENT
Patients LFT trending down. Hep serology negative. No h/o alcohol intake, cause of LFt abnormalities not clear. Although Ibuprofen has very low liver toxicity in the absence of any other cause will recommend to avoid ibuprofen.  Monitor LFTs till normal can be done as outpatient

## 2018-06-06 VITALS
DIASTOLIC BLOOD PRESSURE: 80 MMHG | SYSTOLIC BLOOD PRESSURE: 113 MMHG | HEART RATE: 95 BPM | RESPIRATION RATE: 18 BRPM | TEMPERATURE: 98 F

## 2018-06-06 LAB
ALBUMIN SERPL ELPH-MCNC: 4.2 G/DL — SIGNIFICANT CHANGE UP (ref 3.5–5.2)
ALP SERPL-CCNC: 105 U/L — SIGNIFICANT CHANGE UP (ref 30–115)
ALT FLD-CCNC: 111 U/L — HIGH (ref 0–41)
ANION GAP SERPL CALC-SCNC: 12 MMOL/L — SIGNIFICANT CHANGE UP (ref 7–14)
AST SERPL-CCNC: 39 U/L — SIGNIFICANT CHANGE UP (ref 0–41)
BILIRUB SERPL-MCNC: <0.2 MG/DL — SIGNIFICANT CHANGE UP (ref 0.2–1.2)
BUN SERPL-MCNC: 15 MG/DL — SIGNIFICANT CHANGE UP (ref 10–20)
CALCIUM SERPL-MCNC: 9.3 MG/DL — SIGNIFICANT CHANGE UP (ref 8.5–10.1)
CHLORIDE SERPL-SCNC: 101 MMOL/L — SIGNIFICANT CHANGE UP (ref 98–110)
CO2 SERPL-SCNC: 25 MMOL/L — SIGNIFICANT CHANGE UP (ref 17–32)
CREAT SERPL-MCNC: 0.7 MG/DL — SIGNIFICANT CHANGE UP (ref 0.7–1.5)
CULTURE RESULTS: NO GROWTH — SIGNIFICANT CHANGE UP
GLUCOSE SERPL-MCNC: 108 MG/DL — HIGH (ref 70–99)
HCT VFR BLD CALC: 34.3 % — LOW (ref 42–52)
HGB BLD-MCNC: 11.5 G/DL — LOW (ref 14–18)
MCHC RBC-ENTMCNC: 30.2 PG — SIGNIFICANT CHANGE UP (ref 27–31)
MCHC RBC-ENTMCNC: 33.5 G/DL — SIGNIFICANT CHANGE UP (ref 32–37)
MCV RBC AUTO: 90 FL — SIGNIFICANT CHANGE UP (ref 80–94)
NRBC # BLD: 0 /100 WBCS — SIGNIFICANT CHANGE UP (ref 0–0)
PLATELET # BLD AUTO: 175 K/UL — SIGNIFICANT CHANGE UP (ref 130–400)
POTASSIUM SERPL-MCNC: 4.2 MMOL/L — SIGNIFICANT CHANGE UP (ref 3.5–5)
POTASSIUM SERPL-SCNC: 4.2 MMOL/L — SIGNIFICANT CHANGE UP (ref 3.5–5)
PROT SERPL-MCNC: 6.3 G/DL — SIGNIFICANT CHANGE UP (ref 6–8)
RBC # BLD: 3.81 M/UL — LOW (ref 4.7–6.1)
RBC # FLD: 13.4 % — SIGNIFICANT CHANGE UP (ref 11.5–14.5)
SODIUM SERPL-SCNC: 138 MMOL/L — SIGNIFICANT CHANGE UP (ref 135–146)
SPECIMEN SOURCE: SIGNIFICANT CHANGE UP
WBC # BLD: 8.48 K/UL — SIGNIFICANT CHANGE UP (ref 4.8–10.8)
WBC # FLD AUTO: 8.48 K/UL — SIGNIFICANT CHANGE UP (ref 4.8–10.8)

## 2018-06-06 RX ADMIN — METHOCARBAMOL 750 MILLIGRAM(S): 500 TABLET, FILM COATED ORAL at 06:17

## 2018-06-06 RX ADMIN — PANTOPRAZOLE SODIUM 40 MILLIGRAM(S): 20 TABLET, DELAYED RELEASE ORAL at 06:17

## 2018-06-06 RX ADMIN — METHADONE HYDROCHLORIDE 70 MILLIGRAM(S): 40 TABLET ORAL at 12:34

## 2018-06-06 NOTE — PROGRESS NOTE ADULT - ASSESSMENT
The patient is a 33 year old man who we were asked to see for abnormal LFTs which are now improving.  He is currently pain free and denies nausea, vomiting.  It has been suspected that the patient's abnormal LFTs are secondary to the use of advil which he takes regularly.  The patient states he was taking advil 4-6 tabs per day and he denies ETOH use.  His LFTs continue to improve since admission.  I have cautioned him against the use of Advil.  He may take an occasional ASA or tylenol for pain.  His liver work up thus far has been unrevealing.  If LFTs do not to continue to steadily improve you may consider obtaining LLOYD, ASMA, AMA, ferritin, ceruloplasm and a CT scan of the abdomen.

## 2018-06-06 NOTE — PROGRESS NOTE ADULT - SUBJECTIVE AND OBJECTIVE BOX
CURRENT COMPLAINTS:  The patient is a 33 year old man who we were asked to see for abnormal LFTs which are now improving.  He is currently pain free and denies nausea, vomiting.  It is suspected that the patient's abnormal LFTs are secondary to the use of advil which he takes regularly.    PAST MEDICAL & SURGICAL HISTORY:  Substance abuse      ALLERGIES  No Known Allergies      MEDICATIONS  methadone    Tablet 70 milliGRAM(s) Oral daily  methocarbamol 750 milliGRAM(s) Oral two times a day  pantoprazole    Tablet 40 milliGRAM(s) Oral before breakfast  sodium chloride 0.9% Bolus 2000 milliLiter(s) IV Bolus once      Physical Exam:  Vital Signs Last 24 Hrs  T(C): 35.7 (06 Jun 2018 06:07), Max: 36.7 (05 Jun 2018 14:31)  T(F): 96.2 (06 Jun 2018 06:07), Max: 98 (05 Jun 2018 14:31)  HR: 75 (06 Jun 2018 06:07) (74 - 80)  BP: 117/90 (06 Jun 2018 06:07) (117/90 - 141/99)  BP(mean): --  RR: 18 (06 Jun 2018 06:07) (18 - 18)  SpO2: --  HEENT:          Anicteric, neck supple, no JVD.  Chest:            No rales, rhonchi or wheezes.  Full breath sounds.  Cardiac:         RRR No S3/S4  Abdomen:     Soft, non-tender, non-distended, normoactive bowel sounds.  Extremities:  No edema  Neurologic:   Alert and oriented x 3.    Laboratories:    CBC Full  -  ( 06 Jun 2018 07:00 )  WBC Count : 8.48 K/uL  Hemoglobin : 11.5 g/dL  Hematocrit : 34.3 %  Platelet Count - Automated : 175 K/uL  Mean Cell Volume : 90.0 fL  Mean Cell Hemoglobin : 30.2 pg  Mean Cell Hemoglobin Concentration : 33.5 g/dL  Auto Neutrophil # : x  Auto Lymphocyte # : x  Auto Monocyte # : x  Auto Eosinophil # : x  Auto Basophil # : x  Auto Neutrophil % : x  Auto Lymphocyte % : x  Auto Monocyte % : x  Auto Eosinophil % : x  Auto Basophil % : x    06-06    138  |  101  |  15  ----------------------------<  108<H>  4.2   |  25  |  0.7    Ca    9.3      06 Jun 2018 07:00  Mg     2.0     06-05    TPro  6.3  /  Alb  4.2  /  TBili  <0.2  /  DBili  x   /  AST  39  /  ALT  111<H>  /  AlkPhos  105  06-06      Hepatic Serologies:          06-04-18 @ 07:29  Hepatitis A IgM Ab: Nonreact  Hepatitis A Total IgM & IgG Ab:--  HBsAg: Nonreact  HBeAg:--  HBeAb:--  HBcAb-IgM: Nonreact  HBsAb:--  HCV Ab: --  Hepatitis C Virus RNA Detection by PCR: --  LLOYD: --  ASMA: --  AMA: --  Ceruloplasm: --  Ferritin: --  % saturation:--  Alpha One Anti-Trypsin Level:--             Radiologic Imaging:

## 2018-06-08 DIAGNOSIS — R10.13 EPIGASTRIC PAIN: ICD-10-CM

## 2018-06-08 DIAGNOSIS — R51 HEADACHE: ICD-10-CM

## 2018-06-08 DIAGNOSIS — T39.315A ADVERSE EFFECT OF PROPIONIC ACID DERIVATIVES, INITIAL ENCOUNTER: ICD-10-CM

## 2018-06-08 DIAGNOSIS — R74.0 NONSPECIFIC ELEVATION OF LEVELS OF TRANSAMINASE AND LACTIC ACID DEHYDROGENASE [LDH]: ICD-10-CM

## 2018-06-08 DIAGNOSIS — F11.10 OPIOID ABUSE, UNCOMPLICATED: ICD-10-CM

## 2018-06-08 DIAGNOSIS — G89.4 CHRONIC PAIN SYNDROME: ICD-10-CM

## 2018-06-08 DIAGNOSIS — F17.210 NICOTINE DEPENDENCE, CIGARETTES, UNCOMPLICATED: ICD-10-CM

## 2018-06-08 DIAGNOSIS — D72.829 ELEVATED WHITE BLOOD CELL COUNT, UNSPECIFIED: ICD-10-CM

## 2018-06-08 DIAGNOSIS — I95.9 HYPOTENSION, UNSPECIFIED: ICD-10-CM

## 2018-06-21 ENCOUNTER — APPOINTMENT (OUTPATIENT)
Dept: INTERNAL MEDICINE | Facility: HOSPITAL | Age: 33
End: 2018-06-21

## 2019-02-16 ENCOUNTER — OUTPATIENT (OUTPATIENT)
Dept: OUTPATIENT SERVICES | Facility: HOSPITAL | Age: 34
LOS: 1 days | Discharge: HOME | End: 2019-02-16

## 2019-02-16 DIAGNOSIS — Z00.8 ENCOUNTER FOR OTHER GENERAL EXAMINATION: ICD-10-CM

## 2019-02-16 PROBLEM — F19.10 OTHER PSYCHOACTIVE SUBSTANCE ABUSE, UNCOMPLICATED: Chronic | Status: ACTIVE | Noted: 2018-06-03

## 2019-02-27 LAB
ALBUMIN SERPL ELPH-MCNC: 4.4 G/DL — SIGNIFICANT CHANGE UP (ref 3.5–5.2)
ALP SERPL-CCNC: 94 U/L — SIGNIFICANT CHANGE UP (ref 30–115)
ALT FLD-CCNC: 39 U/L — SIGNIFICANT CHANGE UP (ref 0–41)
ANION GAP SERPL CALC-SCNC: 13 MMOL/L — SIGNIFICANT CHANGE UP (ref 7–14)
APPEARANCE UR: CLEAR — SIGNIFICANT CHANGE UP
AST SERPL-CCNC: 31 U/L — SIGNIFICANT CHANGE UP (ref 0–41)
BILIRUB DIRECT SERPL-MCNC: <0.2 MG/DL — SIGNIFICANT CHANGE UP (ref 0–0.2)
BILIRUB INDIRECT FLD-MCNC: 0 MG/DL — SIGNIFICANT CHANGE UP (ref 0.2–1.2)
BILIRUB SERPL-MCNC: <0.2 MG/DL — SIGNIFICANT CHANGE UP (ref 0.2–1.2)
BILIRUB UR-MCNC: NEGATIVE — SIGNIFICANT CHANGE UP
BUN SERPL-MCNC: 16 MG/DL — SIGNIFICANT CHANGE UP (ref 10–20)
CALCIUM SERPL-MCNC: 9.3 MG/DL — SIGNIFICANT CHANGE UP (ref 8.5–10.1)
CHLORIDE SERPL-SCNC: 102 MMOL/L — SIGNIFICANT CHANGE UP (ref 98–110)
CHOLEST SERPL-MCNC: 173 MG/DL — SIGNIFICANT CHANGE UP (ref 100–200)
CO2 SERPL-SCNC: 29 MMOL/L — SIGNIFICANT CHANGE UP (ref 17–32)
COLOR SPEC: YELLOW — SIGNIFICANT CHANGE UP
CREAT SERPL-MCNC: 0.8 MG/DL — SIGNIFICANT CHANGE UP (ref 0.7–1.5)
DIFF PNL FLD: NEGATIVE — SIGNIFICANT CHANGE UP
ESTIMATED AVERAGE GLUCOSE: 103 MG/DL — SIGNIFICANT CHANGE UP (ref 68–114)
GLUCOSE SERPL-MCNC: 90 MG/DL — SIGNIFICANT CHANGE UP (ref 70–99)
GLUCOSE UR QL: NEGATIVE MG/DL — SIGNIFICANT CHANGE UP
HBA1C BLD-MCNC: 5.2 % — SIGNIFICANT CHANGE UP (ref 4–5.6)
HCT VFR BLD CALC: 42.3 % — SIGNIFICANT CHANGE UP (ref 42–52)
HDLC SERPL-MCNC: 41 MG/DL — SIGNIFICANT CHANGE UP
HGB BLD-MCNC: 13.7 G/DL — LOW (ref 14–18)
KETONES UR-MCNC: NEGATIVE — SIGNIFICANT CHANGE UP
LEUKOCYTE ESTERASE UR-ACNC: NEGATIVE — SIGNIFICANT CHANGE UP
LIPID PNL WITH DIRECT LDL SERPL: 116 MG/DL — SIGNIFICANT CHANGE UP (ref 4–129)
MAGNESIUM SERPL-MCNC: 2.1 MG/DL — SIGNIFICANT CHANGE UP (ref 1.8–2.4)
MCHC RBC-ENTMCNC: 30 PG — SIGNIFICANT CHANGE UP (ref 27–31)
MCHC RBC-ENTMCNC: 32.4 G/DL — SIGNIFICANT CHANGE UP (ref 32–37)
MCV RBC AUTO: 92.8 FL — SIGNIFICANT CHANGE UP (ref 80–94)
NITRITE UR-MCNC: NEGATIVE — SIGNIFICANT CHANGE UP
NRBC # BLD: 0 /100 WBCS — SIGNIFICANT CHANGE UP (ref 0–0)
PH UR: 6 — SIGNIFICANT CHANGE UP (ref 5–8)
PLATELET # BLD AUTO: 216 K/UL — SIGNIFICANT CHANGE UP (ref 130–400)
POTASSIUM SERPL-MCNC: 4.7 MMOL/L — SIGNIFICANT CHANGE UP (ref 3.5–5)
POTASSIUM SERPL-SCNC: 4.7 MMOL/L — SIGNIFICANT CHANGE UP (ref 3.5–5)
PROT SERPL-MCNC: 7.1 G/DL — SIGNIFICANT CHANGE UP (ref 6–8)
PROT UR-MCNC: NEGATIVE MG/DL — SIGNIFICANT CHANGE UP
RBC # BLD: 4.56 M/UL — LOW (ref 4.7–6.1)
RBC # FLD: 12.9 % — SIGNIFICANT CHANGE UP (ref 11.5–14.5)
SODIUM SERPL-SCNC: 144 MMOL/L — SIGNIFICANT CHANGE UP (ref 135–146)
SP GR SPEC: >=1.03 (ref 1.01–1.03)
TOTAL CHOLESTEROL/HDL RATIO MEASUREMENT: 4.2 RATIO — SIGNIFICANT CHANGE UP (ref 4–5.5)
TRIGL SERPL-MCNC: 175 MG/DL — HIGH (ref 10–149)
UROBILINOGEN FLD QL: 0.2 MG/DL — SIGNIFICANT CHANGE UP (ref 0.2–0.2)
WBC # BLD: 5.07 K/UL — SIGNIFICANT CHANGE UP (ref 4.8–10.8)
WBC # FLD AUTO: 5.07 K/UL — SIGNIFICANT CHANGE UP (ref 4.8–10.8)

## 2019-02-28 LAB
HAV IGM SER-ACNC: SIGNIFICANT CHANGE UP
HBV CORE IGM SER-ACNC: SIGNIFICANT CHANGE UP
HBV SURFACE AG SER-ACNC: SIGNIFICANT CHANGE UP
HCV AB S/CO SERPL IA: 0.16 S/CO — SIGNIFICANT CHANGE UP (ref 0–0.79)
HCV AB SERPL-IMP: SIGNIFICANT CHANGE UP

## 2019-03-01 LAB — T PALLIDUM AB TITR SER: NEGATIVE — SIGNIFICANT CHANGE UP

## 2019-03-12 ENCOUNTER — OUTPATIENT (OUTPATIENT)
Dept: OUTPATIENT SERVICES | Facility: HOSPITAL | Age: 34
LOS: 1 days | Discharge: HOME | End: 2019-03-12

## 2019-03-12 DIAGNOSIS — I49.9 CARDIAC ARRHYTHMIA, UNSPECIFIED: ICD-10-CM

## 2019-10-01 NOTE — PROGRESS NOTE ADULT - PROBLEM SELECTOR PLAN 1
done check hepatitis panel; consulted by GI ; outpt work up recommended   Follow CMP and INR; avoid hepatotoxic meds ; pt was consulted regarding Tylenol overdose;

## 2019-12-14 ENCOUNTER — EMERGENCY (EMERGENCY)
Facility: HOSPITAL | Age: 34
LOS: 0 days | Discharge: HOME | End: 2019-12-14
Attending: EMERGENCY MEDICINE | Admitting: EMERGENCY MEDICINE
Payer: MEDICAID

## 2019-12-14 VITALS
OXYGEN SATURATION: 100 % | DIASTOLIC BLOOD PRESSURE: 90 MMHG | WEIGHT: 149.91 LBS | SYSTOLIC BLOOD PRESSURE: 130 MMHG | RESPIRATION RATE: 16 BRPM | HEART RATE: 102 BPM | TEMPERATURE: 98 F

## 2019-12-14 DIAGNOSIS — T40.1X1A POISONING BY HEROIN, ACCIDENTAL (UNINTENTIONAL), INITIAL ENCOUNTER: ICD-10-CM

## 2019-12-14 DIAGNOSIS — F17.200 NICOTINE DEPENDENCE, UNSPECIFIED, UNCOMPLICATED: ICD-10-CM

## 2019-12-14 PROCEDURE — 99053 MED SERV 10PM-8AM 24 HR FAC: CPT

## 2019-12-14 PROCEDURE — 99284 EMERGENCY DEPT VISIT MOD MDM: CPT

## 2019-12-14 NOTE — ED PROVIDER NOTE - PATIENT PORTAL LINK FT
You can access the FollowMyHealth Patient Portal offered by [facility] by registering at the following website: [email or web address]. By joining [facility]’s FollowMyHealth portal, you will also be able to view your health information using other applications (apps) compatible with our system.

## 2019-12-14 NOTE — ED ADULT TRIAGE NOTE - CHIEF COMPLAINT QUOTE
Pt came after he was found unresponsive at home and was given Narcan by his girlfriend, as per patient, he took heroin and 2K

## 2019-12-14 NOTE — ED PROVIDER NOTE - NSFOLLOWUPINSTRUCTIONS_ED_ALL_ED_FT
Patient to be discharged from ED. Any available test results were discussed with patient and/or family. Verbal instructions given, including instructions to return to ED immediately for any new, worsening, or concerning symptoms. Patient endorsed understanding. Written discharge instructions additionally given, including follow-up plan.    Drug Overdose  A drug overdose happens when you take too much of a drug. An overdose can occur with illegal drugs, prescription drugs, or over-the-counter (OTC) drugs.    The effects of a drug overdose can be mild, dangerous, or even deadly.    What are the causes?  This condition may be caused by:    Taking too much of a drug by accident.  Taking too much of a drug on purpose.  An error made by a health care provider who prescribes a drug.  An error made by the pharmacist who fills the prescription order.    Drugs that commonly cause overdose include:    Mental health drugs.  Pain medicines.  Illegal drugs.  OTC cough and cold medicines.  Heart medicines.  Seizure medicines.    What increases the risk?  A drug overdose is more likely in:    Children. They may be attracted to colorful pills. Because of a child's small size, even a small amount of a drug can be dangerous.  Elderly people. They may be taking many different drugs. Elderly people may have difficulty reading labels or remembering when they last took their medicine.    The risk of a drug overdose is also higher for someone who:    Takes illegal drugs.  Takes a drug and drinks alcohol.  Has a mental health condition.    What are the signs or symptoms?  Symptoms of a drug overdose depend on the drug and the amount that was taken. Common danger signs include:    Behavior changes.  Sleepiness.  Slowed breathing.  Nausea and vomiting.  Seizures.  Changes in eye pupil size. The pupil may be very large or very small.    If there are signs and symptoms of very low blood pressure (shock) from an overdose, emergency treatment is required. These include:    Cold and clammy skin.  Pale skin.  Blue lips.  Very slow breathing.  Extreme sleepiness.  Loss of consciousness.    How is this diagnosed?  This condition may be diagnosed based on your symptoms. It is important to tell your health care provider:    All of the drugs that you took.  When you took the drugs.  Whether you were drinking alcohol.    Your health care provider will do a physical exam. This exam may include:    Checking and monitoring your heart rate and rhythm, your temperature, and your blood pressure (vital signs).  Checking your breathing and oxygen level.    You may also have tests, including:    Urine tests to check for drugs in your system.  Blood tests to check for:    Drugs in your system.  Signs of an imbalance of your blood minerals (electrolytes).  Liver damage.  Kidney damage.      How is this treated?  Supporting your vital signs and your breathing is the first step in treating a drug overdose. Treatment may also include:    Giving fluids and electrolytes through an IV tube.  Inserting a breathing tube (endotracheal tube) in your airway to help you breathe.  Passing a tube through your nose and into your stomach (NG tube, or nasogastric tube) to wash out your stomach.  Giving medicines that:    Make you vomit.  Absorb any medicine that is left in your digestive system.  Block or reverse the effect of the drug that caused the overdose.    Filtering your blood through an artificial kidney machine (hemodialysis). You may need this if your overdose is severe or if you have kidney failure.  Ongoing counseling and mental health support if you intentionally overdosed or used an illegal drug.    Follow these instructions at home:  Take medicines only as directed by your health care provider. Always ask your health care provider about possible side effects of any new drug that you start taking.  Keep a list of all of the drugs that you take, including over-the-counter medicines. Bring this list with you to all of your medical visits.  Drink enough fluid to keep your urine clear or pale yellow.  Keep all follow-up visits as directed by your health care provider. This is important.  How is this prevented?  Get help if you are struggling with:    Alcohol or drug use.  Depression or another mental health problem.    Keep the phone number of your local poison control center near your phone or on your cell phone.  Store all medicines in safety containers that are out of the reach of children.  Read the drug inserts that come with your medicines.  Do not use illegal drugs.  Do not drink alcohol when taking drugs.  Do not take medicines that are not prescribed for you.  Contact a health care provider if:  Your symptoms return.  You develop new symptoms or side effects when you take medicines.  Get help right away if:  You think that you or someone else may have taken too much of a drug. The hotline of the National Poison Control Center is (698) 749-4945.  You or someone else is having symptoms of a drug overdose.  You have serious thoughts about hurting yourself or others.  You become confused.  You have:    Chest pain.  Difficulty breathing.  A loss of consciousness.    Drug overdose is an emergency. Do not wait to see if the symptoms will go away. Get medical help right away. Call your local emergency services (911 in the [place] Do not drive yourself to the hospital.     This information is not intended to replace advice given to you by your health care provider. Make sure you discuss any questions you have with your health care provider.

## 2019-12-14 NOTE — ED PROVIDER NOTE - ATTENDING CONTRIBUTION TO CARE
34 y.o. male, PMH of opioid abuse, BIBA after he was found unresponsive by his gf. Pt admits to doing heroin earlier today. Gf states pt was unresponsive and she gave him Narcan with complete resolution of symptoms. Pt is asymptomatic now. On exam, pt in NAD, AAOx3, head NC/AT, CN II-XII intact, lungs CTA B/L, CV S1S2 regular, abdomen soft/NT/ND/(+)BS, ext (-) edema. Will observe and reevaluate.

## 2019-12-14 NOTE — ED PROVIDER NOTE - PHYSICAL EXAMINATION
CONSTITUTIONAL: well-appearing, in NAD  SKIN: Warm dry, normal skin turgor  HEAD: NCAT  EYES: dilated PERRLA, no scleral icterus, conjunctiva pink  ENT: normal pharynx with no erythema or exudates  NECK: Supple; non tender. Full ROM.  CARD: RRR, no murmurs.  RESP: clear to ausculation b/l. No crackles or wheezing.  ABD: soft, non-tender, non-distended, no rebound or guarding.  EXT: Full ROM, no bony tenderness, no pedal edema, no calf tenderness  NEURO: normal motor. normal sensory. Normal gait.  PSYCH: Cooperative, appropriate.

## 2019-12-14 NOTE — ED PROVIDER NOTE - OBJECTIVE STATEMENT
34 y.o M w/ pmhx opioid abuse p/w overdose. Pt was at home, admits to shooting up one bag of heroin. His girlfriend found him unresponsive but breathing. She administered narcan and pt woke up. Pt states this was an accidental overdose. He was trying to get high. no SI/HI/AH/VH. Pt denies, cp, sob, n/v, diarrhea, agitation.

## 2021-06-28 ENCOUNTER — INPATIENT (INPATIENT)
Facility: HOSPITAL | Age: 36
LOS: 0 days | Discharge: LEFT BEFORE DISCHARGE | End: 2021-06-29
Attending: INTERNAL MEDICINE | Admitting: INTERNAL MEDICINE
Payer: MEDICAID

## 2021-06-28 VITALS
TEMPERATURE: 99 F | DIASTOLIC BLOOD PRESSURE: 78 MMHG | WEIGHT: 160.06 LBS | RESPIRATION RATE: 18 BRPM | OXYGEN SATURATION: 100 % | HEART RATE: 100 BPM | HEIGHT: 66 IN | SYSTOLIC BLOOD PRESSURE: 129 MMHG

## 2021-06-28 LAB
ALBUMIN SERPL ELPH-MCNC: 4.4 G/DL — SIGNIFICANT CHANGE UP (ref 3.5–5.2)
ALP SERPL-CCNC: 101 U/L — SIGNIFICANT CHANGE UP (ref 30–115)
ALT FLD-CCNC: 15 U/L — SIGNIFICANT CHANGE UP (ref 0–41)
ANION GAP SERPL CALC-SCNC: 9 MMOL/L — SIGNIFICANT CHANGE UP (ref 7–14)
AST SERPL-CCNC: 29 U/L — SIGNIFICANT CHANGE UP (ref 0–41)
BASOPHILS # BLD AUTO: 0.02 K/UL — SIGNIFICANT CHANGE UP (ref 0–0.2)
BASOPHILS NFR BLD AUTO: 0.3 % — SIGNIFICANT CHANGE UP (ref 0–1)
BILIRUB SERPL-MCNC: 0.3 MG/DL — SIGNIFICANT CHANGE UP (ref 0.2–1.2)
BUN SERPL-MCNC: 13 MG/DL — SIGNIFICANT CHANGE UP (ref 10–20)
CALCIUM SERPL-MCNC: 9.5 MG/DL — SIGNIFICANT CHANGE UP (ref 8.5–10.1)
CHLORIDE SERPL-SCNC: 101 MMOL/L — SIGNIFICANT CHANGE UP (ref 98–110)
CO2 SERPL-SCNC: 29 MMOL/L — SIGNIFICANT CHANGE UP (ref 17–32)
CREAT SERPL-MCNC: 0.7 MG/DL — SIGNIFICANT CHANGE UP (ref 0.7–1.5)
EOSINOPHIL # BLD AUTO: 0.03 K/UL — SIGNIFICANT CHANGE UP (ref 0–0.7)
EOSINOPHIL NFR BLD AUTO: 0.4 % — SIGNIFICANT CHANGE UP (ref 0–8)
ERYTHROCYTE [SEDIMENTATION RATE] IN BLOOD: 42 MM/HR — HIGH (ref 0–10)
GLUCOSE SERPL-MCNC: 105 MG/DL — HIGH (ref 70–99)
HCT VFR BLD CALC: 37 % — LOW (ref 42–52)
HGB BLD-MCNC: 12.3 G/DL — LOW (ref 14–18)
IMM GRANULOCYTES NFR BLD AUTO: 0.3 % — SIGNIFICANT CHANGE UP (ref 0.1–0.3)
LACTATE SERPL-SCNC: 1.3 MMOL/L — SIGNIFICANT CHANGE UP (ref 0.7–2)
LYMPHOCYTES # BLD AUTO: 1.27 K/UL — SIGNIFICANT CHANGE UP (ref 1.2–3.4)
LYMPHOCYTES # BLD AUTO: 18.9 % — LOW (ref 20.5–51.1)
MCHC RBC-ENTMCNC: 28.7 PG — SIGNIFICANT CHANGE UP (ref 27–31)
MCHC RBC-ENTMCNC: 33.2 G/DL — SIGNIFICANT CHANGE UP (ref 32–37)
MCV RBC AUTO: 86.4 FL — SIGNIFICANT CHANGE UP (ref 80–94)
MONOCYTES # BLD AUTO: 0.59 K/UL — SIGNIFICANT CHANGE UP (ref 0.1–0.6)
MONOCYTES NFR BLD AUTO: 8.8 % — SIGNIFICANT CHANGE UP (ref 1.7–9.3)
NEUTROPHILS # BLD AUTO: 4.78 K/UL — SIGNIFICANT CHANGE UP (ref 1.4–6.5)
NEUTROPHILS NFR BLD AUTO: 71.3 % — SIGNIFICANT CHANGE UP (ref 42.2–75.2)
NRBC # BLD: 0 /100 WBCS — SIGNIFICANT CHANGE UP (ref 0–0)
PLATELET # BLD AUTO: 265 K/UL — SIGNIFICANT CHANGE UP (ref 130–400)
POTASSIUM SERPL-MCNC: 5.3 MMOL/L — HIGH (ref 3.5–5)
POTASSIUM SERPL-SCNC: 5.3 MMOL/L — HIGH (ref 3.5–5)
PROT SERPL-MCNC: 7.8 G/DL — SIGNIFICANT CHANGE UP (ref 6–8)
RBC # BLD: 4.28 M/UL — LOW (ref 4.7–6.1)
RBC # FLD: 12.9 % — SIGNIFICANT CHANGE UP (ref 11.5–14.5)
SARS-COV-2 RNA SPEC QL NAA+PROBE: SIGNIFICANT CHANGE UP
SODIUM SERPL-SCNC: 139 MMOL/L — SIGNIFICANT CHANGE UP (ref 135–146)
WBC # BLD: 6.71 K/UL — SIGNIFICANT CHANGE UP (ref 4.8–10.8)
WBC # FLD AUTO: 6.71 K/UL — SIGNIFICANT CHANGE UP (ref 4.8–10.8)

## 2021-06-28 PROCEDURE — 93010 ELECTROCARDIOGRAM REPORT: CPT

## 2021-06-28 PROCEDURE — 99221 1ST HOSP IP/OBS SF/LOW 40: CPT

## 2021-06-28 PROCEDURE — 99285 EMERGENCY DEPT VISIT HI MDM: CPT | Mod: 25

## 2021-06-28 PROCEDURE — 73090 X-RAY EXAM OF FOREARM: CPT | Mod: 26,50

## 2021-06-28 PROCEDURE — 99223 1ST HOSP IP/OBS HIGH 75: CPT

## 2021-06-28 PROCEDURE — 76882 US LMTD JT/FCL EVL NVASC XTR: CPT | Mod: 26,59

## 2021-06-28 RX ORDER — METHADONE HYDROCHLORIDE 40 MG/1
5 TABLET ORAL EVERY 12 HOURS
Refills: 0 | Status: CANCELLED | OUTPATIENT
Start: 2021-07-01 | End: 2021-06-29

## 2021-06-28 RX ORDER — FAMOTIDINE 10 MG/ML
20 INJECTION INTRAVENOUS ONCE
Refills: 0 | Status: DISCONTINUED | OUTPATIENT
Start: 2021-06-28 | End: 2021-06-29

## 2021-06-28 RX ORDER — ONDANSETRON 8 MG/1
4 TABLET, FILM COATED ORAL EVERY 6 HOURS
Refills: 0 | Status: DISCONTINUED | OUTPATIENT
Start: 2021-06-28 | End: 2021-06-29

## 2021-06-28 RX ORDER — AMPICILLIN SODIUM AND SULBACTAM SODIUM 250; 125 MG/ML; MG/ML
1.5 INJECTION, POWDER, FOR SUSPENSION INTRAMUSCULAR; INTRAVENOUS EVERY 6 HOURS
Refills: 0 | Status: DISCONTINUED | OUTPATIENT
Start: 2021-06-28 | End: 2021-06-29

## 2021-06-28 RX ORDER — PROCHLORPERAZINE MALEATE 5 MG
10 TABLET ORAL EVERY 8 HOURS
Refills: 0 | Status: DISCONTINUED | OUTPATIENT
Start: 2021-06-28 | End: 2021-06-29

## 2021-06-28 RX ORDER — ENOXAPARIN SODIUM 100 MG/ML
40 INJECTION SUBCUTANEOUS AT BEDTIME
Refills: 0 | Status: DISCONTINUED | OUTPATIENT
Start: 2021-06-28 | End: 2021-06-29

## 2021-06-28 RX ORDER — SODIUM CHLORIDE 9 MG/ML
2000 INJECTION, SOLUTION INTRAVENOUS ONCE
Refills: 0 | Status: COMPLETED | OUTPATIENT
Start: 2021-06-28 | End: 2021-06-28

## 2021-06-28 RX ORDER — VANCOMYCIN HCL 1 G
1000 VIAL (EA) INTRAVENOUS EVERY 12 HOURS
Refills: 0 | Status: DISCONTINUED | OUTPATIENT
Start: 2021-06-28 | End: 2021-06-29

## 2021-06-28 RX ORDER — AMPICILLIN SODIUM AND SULBACTAM SODIUM 250; 125 MG/ML; MG/ML
3 INJECTION, POWDER, FOR SUSPENSION INTRAMUSCULAR; INTRAVENOUS ONCE
Refills: 0 | Status: COMPLETED | OUTPATIENT
Start: 2021-06-28 | End: 2021-06-28

## 2021-06-28 RX ORDER — METHADONE HYDROCHLORIDE 40 MG/1
15 TABLET ORAL EVERY 12 HOURS
Refills: 0 | Status: COMPLETED | OUTPATIENT
Start: 2021-06-29 | End: 2021-06-29

## 2021-06-28 RX ORDER — LOPERAMIDE HCL 2 MG
2 TABLET ORAL EVERY 6 HOURS
Refills: 0 | Status: DISCONTINUED | OUTPATIENT
Start: 2021-06-28 | End: 2021-06-29

## 2021-06-28 RX ORDER — METHADONE HYDROCHLORIDE 40 MG/1
TABLET ORAL
Refills: 0 | Status: CANCELLED | OUTPATIENT
Start: 2021-06-29 | End: 2021-06-29

## 2021-06-28 RX ORDER — BUPRENORPHINE AND NALOXONE 2; .5 MG/1; MG/1
0 TABLET SUBLINGUAL
Qty: 0 | Refills: 0 | DISCHARGE

## 2021-06-28 RX ORDER — METHADONE HYDROCHLORIDE 40 MG/1
10 TABLET ORAL EVERY 12 HOURS
Refills: 0 | Status: DISCONTINUED | OUTPATIENT
Start: 2021-06-30 | End: 2021-06-29

## 2021-06-28 RX ORDER — ACETAMINOPHEN 500 MG
650 TABLET ORAL EVERY 6 HOURS
Refills: 0 | Status: DISCONTINUED | OUTPATIENT
Start: 2021-06-28 | End: 2021-06-29

## 2021-06-28 RX ADMIN — Medication 2 MILLIGRAM(S): at 22:09

## 2021-06-28 RX ADMIN — ENOXAPARIN SODIUM 40 MILLIGRAM(S): 100 INJECTION SUBCUTANEOUS at 21:13

## 2021-06-28 RX ADMIN — Medication 250 MILLIGRAM(S): at 17:01

## 2021-06-28 RX ADMIN — AMPICILLIN SODIUM AND SULBACTAM SODIUM 200 GRAM(S): 250; 125 INJECTION, POWDER, FOR SUSPENSION INTRAMUSCULAR; INTRAVENOUS at 08:24

## 2021-06-28 RX ADMIN — SODIUM CHLORIDE 2000 MILLILITER(S): 9 INJECTION, SOLUTION INTRAVENOUS at 06:45

## 2021-06-28 RX ADMIN — Medication 1 TABLET(S): at 06:45

## 2021-06-28 RX ADMIN — AMPICILLIN SODIUM AND SULBACTAM SODIUM 100 GRAM(S): 250; 125 INJECTION, POWDER, FOR SUSPENSION INTRAMUSCULAR; INTRAVENOUS at 23:12

## 2021-06-28 RX ADMIN — AMPICILLIN SODIUM AND SULBACTAM SODIUM 100 GRAM(S): 250; 125 INJECTION, POWDER, FOR SUSPENSION INTRAMUSCULAR; INTRAVENOUS at 17:01

## 2021-06-28 RX ADMIN — AMPICILLIN SODIUM AND SULBACTAM SODIUM 100 GRAM(S): 250; 125 INJECTION, POWDER, FOR SUSPENSION INTRAMUSCULAR; INTRAVENOUS at 13:58

## 2021-06-28 NOTE — ED PROVIDER NOTE - OBJECTIVE STATEMENT
37 yo M with PMH IVDU, opioid use, anxiety, depression, ADHD who presents with AMS s/p acid and heroin use.  Pt took acid 6 hours PTA and heroin 1 hr PTA.  Girlfriend called EMS because pt stating he feels disconnected from his body and that his limbs are falling off.  No other drug ingestion, not laced, took for recreational purposes.  Pt denies any fevers, chills, SI/HI, chest pain, SOB, n/v/d, diaphoresis, palpitations, abdominal pain, blurry vision, headaches, focal weakness. 35 yo M with PMH IVDU, opioid use, anxiety, depression, ADHD who presents with AMS s/p acid and heroin use.  Pt took acid 6 hours PTA and heroin 1 hr PTA.  Girlfriend called EMS because pt stating he feels disconnected from his body and that his limbs are falling off.  No other drug ingestion, not laced, took for recreational purposes.  However, pt does have significant bilateral forearm cellulitis.  Pt denies any fevers, chills, SI/HI, chest pain, SOB, n/v/d, diaphoresis, palpitations, abdominal pain, blurry vision, headaches, focal weakness.

## 2021-06-28 NOTE — CONSULT NOTE ADULT - ATTENDING COMMENTS
Mr Zaragoza is a 36 year old man with a history of Anxiety, depression and polysubstance dependence who was admitted to the medical floor for the management of upper extremity cellulitis.   Addiction consult was called for the management of Opioid Dependence.   Although patient reports having feelings of discomfort as a result of opioid withdrawals , there did not appear to be any overt signs or symptoms of opioid withdrawals. Patient is however amenable to starting methadone maintenance for opioid dependence and wishes to be induced on methadone. Patient was informed of the plan to refer him to a methadone program as per the [facility] team social workers and counsellors. Patient denies any acute signs or symptoms of psychosis , yumiko or major depression. he denies current suicidal ideations, intent or plan.   At this time, patient is not considered an imminent danger to himself or others and does not need inpatient psychiatric hospitalization. Since patient is averse to stating Suboxone, for now, he will be started on a methadone taper until we can confirm outpatient follow up at a methadone program. We recommend the severe methadone taper order set in addition to symptomatic management of opioid withdrawals . The [facility] team social workers and counsellors will see patient shortly.

## 2021-06-28 NOTE — H&P ADULT - NSHPLABSRESULTS_GEN_ALL_CORE
12.3   6.71  )-----------( 265      ( 28 Jun 2021 06:00 )             37.0       06-28    139  |  101  |  13  ----------------------------<  105<H>  5.3<H>   |  29  |  0.7    Ca    9.5      28 Jun 2021 06:00    TPro  7.8  /  Alb  4.4  /  TBili  0.3  /  DBili  x   /  AST  29  /  ALT  15  /  AlkPhos  101  06-28                      Lactate Trend  06-28 @ 06:37 Lactate:1.3             CAPILLARY BLOOD GLUCOSE

## 2021-06-28 NOTE — H&P ADULT - NSHPREVIEWOFSYSTEMS_GEN_ALL_CORE
REVIEW OF SYSTEMS:    CONSTITUTIONAL: AMS improved  EYES/ENT: No visual changes;  No vertigo or throat pain   NECK: No pain or stiffness  RESPIRATORY: No cough, wheezing, hemoptysis; No shortness of breath  CARDIOVASCULAR: No chest pain or palpitations  GASTROINTESTINAL: No abdominal or epigastric pain. No nausea, vomiting, or hematemesis; No diarrhea or constipation. No melena or hematochezia.  GENITOURINARY: No dysuria, frequency or hematuria  MSK: b/l upper arm swelling + pain  NEUROLOGICAL: No numbness or weakness  SKIN: No itching, rashes

## 2021-06-28 NOTE — ED ADULT TRIAGE NOTE - CHIEF COMPLAINT QUOTE
BIBA for use of Heroin and Acid about 1 hour ago. Pt states he feels "disconnected." No SOB or distress in Triage.

## 2021-06-28 NOTE — ED PROCEDURE NOTE - ATTENDING CONTRIBUTION TO CARE
I personally supervised the study. I reviewed the images and interpretation by the resident/ACP and have edited where appropriate

## 2021-06-28 NOTE — ED PROVIDER NOTE - ATTENDING CONTRIBUTION TO CARE
Patient is presented to ED s/p drug use. Patient also uses IVDU, has b/l forearm pain/swelling/redness from IVDU injections. Denies f/c/cp/sob.   Vitals reviewed.   Patient is awake, alert, o x 3, speaking in full sentences, appears comfortable and well.   Lungs: CTA  B/L upper ext have swelling, +scattered areas of erythema, +warmth, +tender, no fluctuation noted, B/L upper ext are distally NVI.   A/P: B/L upper ext cellulitis,   Drug use,   labs, IV abx,   admission.

## 2021-06-28 NOTE — H&P ADULT - NSHPPHYSICALEXAM_GEN_ALL_CORE
General: NAD  Neuro: alert and oriented, no focal deficits, moves all extremities spontaneously  HEENT: NCAT, EOMI, anicteric, mucosa moist  Respiratory: airway patent, respirations unlabored  CVS: regular rate and rhythm  Abdomen: soft, nontender, nondistended  Extremities: b/l UE with track marks, R>L, with non pitting edema, + tense and erythema, no purulence noted, no crepitance, pulses intact

## 2021-06-28 NOTE — ED PROVIDER NOTE - PHYSICAL EXAMINATION
CONSTITUTIONAL: in no acute distress, afebrile  SKIN: Warm, dry, not diaphoretic  HEAD: Normocephalic; atraumatic  EYES: No conjunctival injection. PERRLA. EOMI  ENT: No nasal discharge; oropharynx nonerythematous; airway clear  NECK: Supple; non tender, No JVD  CARD:  Regular rate and rhythm  RESP: CTAB  ABD: Soft NTND; No guarding or rebound tenderness  EXT: Normal ROM.  No clubbing or cyanosis.  forearms bilaterally multiple areas or erythema, indurated, very mildly tender  NEURO: A&O x3, grossly unremarkable, no focal deficits; CN 2-12 grossly intact. +5/5 strength and sensation wnl in all extremities  PSYCH: Cooperative, no SI/HI CONSTITUTIONAL: in no acute distress, afebrile  SKIN: Warm, dry, not diaphoretic  HEAD: Normocephalic; atraumatic  EYES: No conjunctival injection. PERRLA. EOMI  ENT: No nasal discharge; oropharynx nonerythematous; airway clear  NECK: Supple; non tender, No JVD  CARD:  Regular rate and rhythm  RESP: CTAB  ABD: Soft NTND; No guarding or rebound tenderness  EXT: Normal ROM.  No clubbing or cyanosis.  forearms bilaterally multiple areas or erythema, indurated, very mildly tender, cellulitic; no crepitus; bedside US showed cellulitis without evidence of nec fasc  NEURO: A&O x3, grossly unremarkable, no focal deficits; CN 2-12 grossly intact. +5/5 strength and sensation wnl in all extremities  PSYCH: Cooperative, no SI/HI

## 2021-06-28 NOTE — H&P ADULT - ATTENDING COMMENTS
37 YO M with a PMH of PSA (including IVDU Heroin) who presented to the hospital with a c/o a "bad trip" but was found to have UE swelling/redness. Associated with pain. This is in the area of his IV drug use and pt sometimes shares needles. ROS negative except as above.     In the ED, bedside US showed possible cobblestoning. Cultures obtained and pt started on IV ABXs (Unasyn/Vanco) and IVFs (LR).     FMHx: Reviewed, not relevant    Physical exam shows pt in NAD. VSS, afebrile, not hypoxic on RA. A&Ox3. Non-focal neuro exam. Muscle strength/sensation intact. CTA B/L with no W/C/R. RRR, no M/G/R. ABD is soft and non-tender, normoactive BSs. B/L UE with track marks, R>L, with non pitting edema, + tense and erythema, no purulence noted, no crepitance, pulses intact. Labs and radiology as above.    B/L UE redness and swelling, likely cellulitis, pt is high risk and needs nec fasciitis rule out. CT-UEs w/ IV contrast. PRN pain meds (avoid opioids if possible). FU cultures. IV ABXs (Unasyn/Vanco). HIV, Hep panel, ESR/CRP, and procal. ID consult. Detox consult.     Hyperkalemia, hemolyzed. Repeat BMP    HX of PSA. Restart home meds, except as stated above. DVT PPX. Inform PCP of pt's admission to hospital. My note supersedes the residents note.

## 2021-06-28 NOTE — ED ADULT NURSE NOTE - CAS TRG GENERAL AIRWAY, MLM
Patient with urinary retention post-op and likely related to narcotics. Joshua catheter replaced on 4/25 as unable to void despite several I+O caths and re attempt voiding trial on 4/30.     Patent

## 2021-06-28 NOTE — H&P ADULT - HISTORY OF PRESENT ILLNESS
35 yo M with PMH active IVDU Heroin, Methamphetamine, LSD, Anxiety, Depression who presents to the ED via EMS after having a "bad trip" with LSD, meth, and Heroin use 6 hours prior to arrival. Patient was brought into ED, after girlfriend activated EMS. He reports that he took LSD around 11 pm, followed by heroin around 12 am, and roughly about an hour later he started feeling extremely anxious and with an outer body experience. He states that this has never happened to him before and his gf noted him to be more erratic, breathing heavily and repeating "I feel like im going to die, my hands are falling off. He was evaluated in the ED, found to have b/l UE track marks with arm swelling, erythema, and tenderness. Upon interview, patient is back to baseline mental status, AAOx3, and states that he took drugs for recreational use. He endorses that his b/l arm swelling started a week ago after he returned from trip to [place], where he admits to sharing needles. He denies using   35 yo M with PMH active IVDU Heroin, Methamphetamine, LSD, Anxiety, Depression who presents to the ED via EMS after having a "bad trip" with LSD, meth, and Heroin use 6 hours prior to arrival. Patient was brought into ED, after girlfriend activated EMS. He reports that he took LSD around 11 pm, followed by heroin around 12 am, and roughly about an hour later he started feeling extremely anxious and with an outer body experience. He states that this has never happened to him before and his gf noted him to be more erratic, breathing heavily and repeating "I feel like im going to die, my hands are falling off. He was evaluated in the ED, found to have b/l UE track marks with arm swelling, erythema, and tenderness. Upon interview, patient is back to baseline mental status, AAOx3, and states that he took drugs for recreational use and denies any suicidal ideations. He endorses that his b/l arm swelling started a week ago after he returned from trip to [place], where he admits to sharing needles. He endorses acute muscle tenderness b/l arms R>L, rated 7/10 in severity and described as sharp and constant,  but otherwise denies fevers or chills.  He states that he gets his drugs from the same dealer and does not believe it was laced with anything. He otherwise denies any chest pain, sob, nausea, vomiting, dizziness or abdominal discomfort.     Ed course: BP: 128/78, HR: 100, T: 98.8, RR: 16, SPO2 98%. EKG with no ischemic changes, NSR, no leukocytosis, no electrolyte imbalances. Lactate 1.3, s/p bedside Upper extremity US in ED with + cobblestoning, agreeable for inpatient detox

## 2021-06-28 NOTE — ED PROVIDER NOTE - PROGRESS NOTE DETAILS
AH - pt continues to be A&Ox3, calm; I intend to get bedside US to assess for cellulitis and r/o nec fasc;  Bedside US showed cellulitis without evidence of nec fac; Discussed with pt and girlfriend need to IV antibx admission given extensive area of involvement on bilateral forearms, agreed; s/o to MAR

## 2021-06-28 NOTE — H&P ADULT - NSHPSOCIALHISTORY_GEN_ALL_CORE
Marital Status:  (   )    (   ) Single    (   )    (  )   Lives with: (  ) alone  (  ) children   (  ) spouse   (  ) parents  (  ) other  Recent Travel: No recent travel      Substance Use (street drugs): (  ) never used  ( x) other:  Tobacco Usage:  (  ) never smoked   (   ) former smoker   (   ) current smoker  (     ) pack year  Alcohol Usage: None   Baseline mobility: Independent

## 2021-06-28 NOTE — CONSULT NOTE ADULT - SUBJECTIVE AND OBJECTIVE BOX
HPI:      Substance Use History:      Past Psychiatric History:      Family Psychiatric History:      Family Substance Use History:      Social History:        Vitals:  T(C): 36 (06-28-21 @ 15:10), Max: 37.1 (06-28-21 @ 05:01)  HR: 83 (06-28-21 @ 15:10) (83 - 100)  BP: 125/70 (06-28-21 @ 15:10) (125/70 - 129/78)  RR: 18 (06-28-21 @ 15:10) (18 - 18)  SpO2: 100% (06-28-21 @ 05:01) (100% - 100%)    Physical Exam:       Mental Status Exam:   Appearance:   Behavior:   Speech:   Reported Mood:   Affect:   Thought process:   Thought Content:   Perceptions:   Memory:   Orientation:   Insight:   Judgement:  HPI:    35 yo M domiciled with girlfriend with PPHx of anxiety and depression,, no IPP, no SAs, PMH active IVDU Heroin, Methamphetamine, LSD, who presents to the ED via EMS after having a "bad trip" with LSD, meth, and Heroin use 6 hours prior to arrival. Patient was brought into ED, after girlfriend activated EMS. He reports that he took LSD around 11 pm, followed by heroin around 12 am, and roughly about an hour later he started feeling extremely anxious and with an outer body experience. He states that this has never happened to him before and his gf noted him to be more erratic, breathing heavily and repeating "I feel like im going to die, my hands are falling off". He was evaluated in the ED, found to have b/l UE track marks with arm swelling, erythema, and tenderness. Upon interview, patient is back to baseline mental status, AAOx3, and states that he took drugs for recreational use and denies any suicidal ideations. He endorses that his b/l arm swelling started a week ago after he returned from trip to [place], where he admits to sharing needles. He endorses acute muscle tenderness b/l arms R>L, rated 7/10 in severity and described as sharp and constant,  but otherwise denies fevers or chills.  He states that he gets his drugs from the same dealer and does not believe it was laced with anything. He otherwise denies any chest pain, sob, nausea, vomiting, dizziness or abdominal discomfort.      Ed course: BP: 128/78, HR: 100, T: 98.8, RR: 16, SPO2 98%. EKG with no ischemic changes, NSR, no leukocytosis, no electrolyte imbalances. Lactate 1.3, s/p bedside Upper extremity US in ED with + cobblestoning, agreeable for inpatient detox        Substance Use History:  Patient started using opiates when was 28 years old; he states he had a fall/work injury in  that required him to take opiate pain medications, starting with percocet then going to morphine and later fentanyl.  He states that he currently uses 30 bags of heroin per day.  He says the pain medications are what "got him hooked" and he now has to satisfy the cravings.  He does also state that he enjoys the high of heroin as well.  Patient uses Meth, LSD and cannabis as well.  He spends about $20 per day on meth, smokes 2 joints of MJ per day, and uses LSD "once in a while .. maybe a few times a year."  Patient denies alcohol use or tobacco use.  He currently takes alprazolam and Adderall but denies any hx of abusing these meds.  Patient has taken sublocade in the past, about 4 years ago but did not find it helpful for opiate withdrawals.  last took methadone 2 years ago and was on 110mg at [facility].  Was discharged by administration.  Patient wishes to restart methadone and followup with [facility] at time of discharge. He understands that the max dose in the hospital is 40mg /day.      Past Psychiatric History:  Hx of of anxiety and depression, and also ADHD.  Anxiety and depression started "years ago" but cannot recall the date.  Has had a psychiatrist for the past 2 years.  Has never taken antidepressant, but takes alprazolam 2mg BID currently; states that it's "as needed usually once per day."  He does not remember the name of his current psychiatrist or his last one.  He has been seeing the current .  He is also prescribed [id number] BID.  Patient denies feeling depressed most days and states that a few times per week he will feel down and sad but the feelings last 30 minutes and are fleeting.  He endorses good mood most of the time, denies helplessness hopelessness, worthlessness, guilt.  Sleep and appetite are good and without changes recently.  Denies muscle tensions, feeling wiped out, feeling on edge.  Denies AVH, manic symptoms.  Denies SI/HI.  Denies hx of SA.  Denies IPP in the past.      IST[facility] Reference #: [id number]  2021	dextroamp-amphetamin 30 mg tab	60	30	Randolph Loyola	[id number]	[facility]  2021	alprazolam 2 mg tablet	60	30	Randolph Loyola	[id number]	Insurance	[facility]  2021	dextroamp-amphetamin 30 mg tab	60	30	Randolph Loyola	[id number]	[facility]  2021	alprazolam 2 mg tablet	60	30	Randolph Loyola	[id number]	Insurance	[facility]    Family Psychiatric History:  Denies    Family Substance Use History:  Father had alcohol use disorder and  from compilations of bleeding varices    Social History:  Grew up in [place].  Worked in a [employer] for most of his life, but has been unemployed since last year due to the pandemic.  Currently on unemployment.  in a relationship with girlfriend Elenita for the past 5 years.  has no children.  Lives with girlfriend in an apartment in [place].  Father passed away 2 years ago.  Mother lives in [place].  Grandmother has cancer and this is a big stressor for the patient.      Vitals:  T(C): 36 (21 @ 15:10), Max: 37.1 (21 @ 05:01)  HR: 83 (21 @ 15:10) (83 - 100)  BP: 125/70 (21 @ 15:10) (125/70 - 129/78)  RR: 18 (21 @ 15:10) (18 - 18)  SpO2: 100% (21 @ 05:01) (100% - 100%)    Physical Exam:   Patient has subjective feelings of "crawling out of his skin" but no objective signs of opiate withdrawal.  COWS score is 0.    Patient has track marks on bilateral arms with red rashes consistent with cellulitis.      Mental Status Exam:   Appearance: good hygiene, calm and cooperative  Behavior: cooperative  Speech: normal rate, tone, and fluency  Reported Mood: "fine"  Affect: eutyhmic  Thought process: linear and logic   Thought Content: unremarkable  Perceptions: unremarkable  Memory: good  Orientation: A&Ox4  Insight: fair  Judgement: fair

## 2021-06-28 NOTE — ED PROVIDER NOTE - NS ED ROS FT
Review of Systems:  CONSTITUTIONAL: No fever, No diaphoresis  SKIN: No rash  HEMATOLOGIC: No abnormal bleeding or bruising  EYES: No eye pain, No blurred vision  ENT: No change in hearing, No sore throat, No neck pain, No rhinorrhea, No ear pain  RESPIRATORY: No shortness of breath, No cough  CARDIAC: No chest pain, No palpitations  GI: No abdominal pain, No nausea, No vomiting, No diarrhea  MUSCULOSKELETAL: No joint paint, No swelling, No back pain  NEUROLOGIC: No numbness, No focal weakness, No headache, No dizziness, + hallucinations  All other systems negative, unless specified in HPI

## 2021-06-28 NOTE — H&P ADULT - ASSESSMENT
35 yo M with PMH active IVDU Heroin, Methamphetamine, LSD, Anxiety, Depression who presents to the ED via EMS after having a "bad trip" with LSD, meth, and Heroin use 6 hours prior to arrival.    B/L Upper Extremity Cellulitis vs Lymphangitis  Low suspicion for necrotizing fascitis  - patient vitally stable, sepsis not present on admission, Lactate 1.3  - f/u official read X-ray b/l forearms: + evidence of tissue swelling, no evidence of fx or OM on my exam  - f/u Duplex, consider CT scan of b/l upper extremity    37 yo M with PMH active IVDU Heroin, Methamphetamine, LSD, Anxiety,  who presents to the ED via EMS after having a "bad trip" with LSD, meth, and Heroin use 6 hours prior to arrival.    B/L Upper Extremity Cellulitis vs Lymphangitis  Low suspicion for necrotizing fascitis  - patient vitally stable, sepsis not present on admission, Lactate 1.3  - f/u official read X-ray b/l,  forearms: + evidence of tissue swelling, no evidence of fx or OM on my exam  - f/u Duplex, consider CT scan of b/l upper extremity if pain worsens  - will continue Unasyn and Vancomycin for MRSA coverage for now  - f/u procalcitonin, ESR/CRP, ID eval  - warm compress, elevate forearm  - patient has left AC 18 gauge IV, please monitor patient on unit at all times as he is actively using IVD  - f/u HIV, Hep B/C given reported needle sharing  - Tylenol PRN for Pain    Active Polysubstance Use  Hx of Acute opioid withdrawals never hospitalized  - Patient admits to using LSD, Meth, Heroin, Adderall  - Previously enrolled at [facility] methadone clinic, not active, never on suboxone  - Addiction medicine consult: patient agreeable for inpatient detox  - Monitor COWS score for Opioid Withdrawals consider starting methadone taper   - Monitor IV access closely, GF and friend at bedside both admitted to polysubstance abuse with patient    Code: full  diet: regular  DVT ppx: lovenox     37 yo M with PMH active IVDU Heroin, Methamphetamine, LSD, Anxiety,  who presents to the ED via EMS after having a "bad trip" with LSD, meth, and Heroin use 6 hours prior to arrival.    B/L Upper Extremity Cellulitis vs Lymphangitis  Low suspicion for necrotizing fascitis  - patient vitally stable, sepsis not present on admission, Lactate 1.3  - f/u official read X-ray b/l,  forearms: + evidence of tissue swelling, no evidence of fx or OM on my exam  - f/u Duplex, consider CT scan of b/l upper extremity if pain worsens  - will continue Unasyn and Vancomycin for MRSA coverage for now  - f/u procalcitonin, ESR/CRP, ID eval  - warm compress, elevate forearm  - patient has left AC 18 gauge IV, please monitor patient on unit at all times as he is actively using IVD  - f/u HIV, Hep B/C given reported needle sharing  - Tylenol PRN for Pain    Active Polysubstance Use  Hx of Acute opioid withdrawals never hospitalized  - Patient admits to using LSD, Meth, Heroin, Adderall  - Previously enrolled at [facility] methadone clinic, not active, previously on methadone and Suboxone in the past  - Addiction medicine consult: patient agreeable for inpatient detox  - Monitor COWS score for Opioid Withdrawals consider starting methadone taper   - Monitor IV access closely, GF and friend at bedside both admitted to polysubstance abuse with patient    Code: full  diet: regular  DVT ppx: lovenox     35 yo M with PMH active IVDU Heroin, Methamphetamine, LSD, Anxiety,  who presents to the ED via EMS after having a "bad trip" with LSD, meth, and Heroin use 6 hours prior to arrival.    B/L Upper Extremity Cellulitis vs Lymphangitis  Low suspicion for necrotizing fascitis  - patient vitally stable, sepsis not present on admission, Lactate 1.3  - f/u official read X-ray b/l,  forearms: + evidence of tissue swelling, no evidence of fx or OM on my exam  - Patient's b/l forearm mild tense, but radial pulses intact strong, sensation intact, no acute pain, able to move wrist and arm, if tenseness/pain gets worse Consider CT scan of b/l upper extremity .  - will continue Unasyn and Vancomycin for MRSA coverage for now  - f/u procalcitonin, ESR/CRP, ID eval  - warm compress, elevate forearm  - patient has left AC 18 gauge IV, please monitor patient on unit at all times as he is actively using IVD  - f/u HIV, Hep B/C given reported needle sharing  - Tylenol PRN for Pain    Active Polysubstance Use  Hx of Acute opioid withdrawals never hospitalized  - Patient admits to using LSD, Meth, Heroin, Adderall  - Previously enrolled at [facility] methadone clinic, not active, previously on methadone and Suboxone in the past  - Addiction medicine consult: patient agreeable for inpatient detox  - Monitor COWS score for Opioid Withdrawals consider starting methadone taper   - Monitor IV access closely, GF and friend at bedside both admitted to polysubstance abuse with patient    Code: full  diet: regular  DVT ppx: lovenox     35 yo M with PMH active IVDU Heroin, Methamphetamine, LSD, Anxiety,  who presents to the ED via EMS after having a "bad trip" with LSD, meth, and Heroin use 6 hours prior to arrival.    B/L Upper Extremity Cellulitis vs Lymphangitis  R/o necrotizing fascitis  - patient vitally stable, sepsis not present on admission, Lactate 1.3  - f/u official read X-ray b/l,  forearms: + evidence of tissue swelling, no evidence of fx or OM on my exam  - f/u CT Upper extremity w IV: r/o possible nec fascitis vs compartment syndrome (pt R>L forearm tenseness, erythema and warmth)  - will continue Unasyn and Vancomycin for MRSA coverage for now  - f/u procalcitonin, ESR/CRP, ID eval  - warm compress, elevate forearm  - patient has left AC 18 gauge IV, please monitor patient on unit at all times as he is actively using IVD  - f/u HIV, Hep B/C given reported needle sharing  - Tylenol PRN for Pain    Active Polysubstance Use  Hx of Acute opioid withdrawals never hospitalized  - Patient admits to using LSD, Meth, Heroin, Adderall  - Previously enrolled at [facility] methadone clinic, not active, previously on methadone and Suboxone in the past  - Addiction medicine consult: patient agreeable for inpatient detox  - Monitor COWS score for Opioid Withdrawals consider starting methadone taper   - Monitor IV access closely, GF and friend at bedside both admitted to polysubstance abuse with patient    Code: full  diet: regular  DVT ppx: lovenox

## 2021-06-29 VITALS
HEART RATE: 84 BPM | TEMPERATURE: 98 F | SYSTOLIC BLOOD PRESSURE: 136 MMHG | RESPIRATION RATE: 18 BRPM | DIASTOLIC BLOOD PRESSURE: 60 MMHG

## 2021-06-29 LAB
ALBUMIN SERPL ELPH-MCNC: 3.7 G/DL — SIGNIFICANT CHANGE UP (ref 3.5–5.2)
ALP SERPL-CCNC: 100 U/L — SIGNIFICANT CHANGE UP (ref 30–115)
ALT FLD-CCNC: 12 U/L — SIGNIFICANT CHANGE UP (ref 0–41)
AMPHET UR-MCNC: NEGATIVE — SIGNIFICANT CHANGE UP
ANION GAP SERPL CALC-SCNC: 8 MMOL/L — SIGNIFICANT CHANGE UP (ref 7–14)
AST SERPL-CCNC: 16 U/L — SIGNIFICANT CHANGE UP (ref 0–41)
BARBITURATES UR SCN-MCNC: NEGATIVE — SIGNIFICANT CHANGE UP
BASOPHILS # BLD AUTO: 0.03 K/UL — SIGNIFICANT CHANGE UP (ref 0–0.2)
BASOPHILS NFR BLD AUTO: 0.6 % — SIGNIFICANT CHANGE UP (ref 0–1)
BENZODIAZ UR-MCNC: NEGATIVE — SIGNIFICANT CHANGE UP
BILIRUB SERPL-MCNC: <0.2 MG/DL — SIGNIFICANT CHANGE UP (ref 0.2–1.2)
BUN SERPL-MCNC: 10 MG/DL — SIGNIFICANT CHANGE UP (ref 10–20)
CALCIUM SERPL-MCNC: 9 MG/DL — SIGNIFICANT CHANGE UP (ref 8.5–10.1)
CHLORIDE SERPL-SCNC: 102 MMOL/L — SIGNIFICANT CHANGE UP (ref 98–110)
CO2 SERPL-SCNC: 29 MMOL/L — SIGNIFICANT CHANGE UP (ref 17–32)
COCAINE METAB.OTHER UR-MCNC: NEGATIVE — SIGNIFICANT CHANGE UP
COVID-19 SPIKE DOMAIN AB INTERP: POSITIVE
COVID-19 SPIKE DOMAIN ANTIBODY RESULT: 33.5 U/ML — HIGH
CREAT SERPL-MCNC: 0.7 MG/DL — SIGNIFICANT CHANGE UP (ref 0.7–1.5)
CRP SERPL-MCNC: 19 MG/L — HIGH
DRUG SCREEN 1, URINE RESULT: SIGNIFICANT CHANGE UP
EOSINOPHIL # BLD AUTO: 0.08 K/UL — SIGNIFICANT CHANGE UP (ref 0–0.7)
EOSINOPHIL NFR BLD AUTO: 1.5 % — SIGNIFICANT CHANGE UP (ref 0–8)
GLUCOSE SERPL-MCNC: 118 MG/DL — HIGH (ref 70–99)
HCT VFR BLD CALC: 35.1 % — LOW (ref 42–52)
HGB BLD-MCNC: 11.4 G/DL — LOW (ref 14–18)
HIV 1+2 AB+HIV1 P24 AG SERPL QL IA: SIGNIFICANT CHANGE UP
IMM GRANULOCYTES NFR BLD AUTO: 0.2 % — SIGNIFICANT CHANGE UP (ref 0.1–0.3)
LYMPHOCYTES # BLD AUTO: 1.69 K/UL — SIGNIFICANT CHANGE UP (ref 1.2–3.4)
LYMPHOCYTES # BLD AUTO: 31.8 % — SIGNIFICANT CHANGE UP (ref 20.5–51.1)
MAGNESIUM SERPL-MCNC: 2 MG/DL — SIGNIFICANT CHANGE UP (ref 1.8–2.4)
MCHC RBC-ENTMCNC: 28.6 PG — SIGNIFICANT CHANGE UP (ref 27–31)
MCHC RBC-ENTMCNC: 32.5 G/DL — SIGNIFICANT CHANGE UP (ref 32–37)
MCV RBC AUTO: 88 FL — SIGNIFICANT CHANGE UP (ref 80–94)
METHADONE UR-MCNC: NEGATIVE — SIGNIFICANT CHANGE UP
MONOCYTES # BLD AUTO: 0.54 K/UL — SIGNIFICANT CHANGE UP (ref 0.1–0.6)
MONOCYTES NFR BLD AUTO: 10.2 % — HIGH (ref 1.7–9.3)
NEUTROPHILS # BLD AUTO: 2.97 K/UL — SIGNIFICANT CHANGE UP (ref 1.4–6.5)
NEUTROPHILS NFR BLD AUTO: 55.7 % — SIGNIFICANT CHANGE UP (ref 42.2–75.2)
NRBC # BLD: 0 /100 WBCS — SIGNIFICANT CHANGE UP (ref 0–0)
OPIATES UR-MCNC: NEGATIVE — SIGNIFICANT CHANGE UP
PCP UR-MCNC: NEGATIVE — SIGNIFICANT CHANGE UP
PHOSPHATE SERPL-MCNC: 4.1 MG/DL — SIGNIFICANT CHANGE UP (ref 2.1–4.9)
PLATELET # BLD AUTO: 234 K/UL — SIGNIFICANT CHANGE UP (ref 130–400)
POTASSIUM SERPL-MCNC: 4.2 MMOL/L — SIGNIFICANT CHANGE UP (ref 3.5–5)
POTASSIUM SERPL-SCNC: 4.2 MMOL/L — SIGNIFICANT CHANGE UP (ref 3.5–5)
PROCALCITONIN SERPL-MCNC: 0.06 NG/ML — SIGNIFICANT CHANGE UP (ref 0.02–0.1)
PROPOXYPHENE QUALITATIVE URINE RESULT: NEGATIVE — SIGNIFICANT CHANGE UP
PROT SERPL-MCNC: 6.6 G/DL — SIGNIFICANT CHANGE UP (ref 6–8)
RBC # BLD: 3.99 M/UL — LOW (ref 4.7–6.1)
RBC # FLD: 12.9 % — SIGNIFICANT CHANGE UP (ref 11.5–14.5)
SARS-COV-2 IGG+IGM SERPL QL IA: 33.5 U/ML — HIGH
SARS-COV-2 IGG+IGM SERPL QL IA: POSITIVE
SODIUM SERPL-SCNC: 139 MMOL/L — SIGNIFICANT CHANGE UP (ref 135–146)
THC UR QL: NEGATIVE — SIGNIFICANT CHANGE UP
WBC # BLD: 5.32 K/UL — SIGNIFICANT CHANGE UP (ref 4.8–10.8)
WBC # FLD AUTO: 5.32 K/UL — SIGNIFICANT CHANGE UP (ref 4.8–10.8)

## 2021-06-29 PROCEDURE — 99233 SBSQ HOSP IP/OBS HIGH 50: CPT

## 2021-06-29 PROCEDURE — 93970 EXTREMITY STUDY: CPT | Mod: 26

## 2021-06-29 PROCEDURE — 93010 ELECTROCARDIOGRAM REPORT: CPT

## 2021-06-29 RX ORDER — NALOXONE HYDROCHLORIDE 4 MG/.1ML
0.2 SPRAY NASAL
Refills: 0 | Status: DISCONTINUED | OUTPATIENT
Start: 2021-06-29 | End: 2021-06-29

## 2021-06-29 RX ADMIN — Medication 250 MILLIGRAM(S): at 05:18

## 2021-06-29 RX ADMIN — AMPICILLIN SODIUM AND SULBACTAM SODIUM 100 GRAM(S): 250; 125 INJECTION, POWDER, FOR SUSPENSION INTRAMUSCULAR; INTRAVENOUS at 05:18

## 2021-06-29 RX ADMIN — METHADONE HYDROCHLORIDE 15 MILLIGRAM(S): 40 TABLET ORAL at 08:53

## 2021-06-29 NOTE — PROGRESS NOTE ADULT - ASSESSMENT
35 yo M with PMH active IVDU Heroin, Methamphetamine, LSD, Anxiety,  who presents to the ED via EMS after having a "bad trip" with LSD, meth, and Heroin use 6 hours prior to arrival.    B/L Upper Extremity Cellulitis vs Lymphangitis  R/o necrotizing fascitis  - patient vitally stable, sepsis not present on admission, Lactate 1.3  - X-ray b/l,  forearms: Negative for bone abnormalities or subq emphysema  - warm compress, elevate forearm  - HIV neg-  - f/u Hep B/C given reported needle sharing  - Tylenol PRN for Pain  -c/w Unasyn and Vancomycin for MRSA coverage for now  -ESR 42  Plan:  - f/u CT Upper extremity w IV: r/o possible nec fascitis vs compartment syndrome (pt R>L forearm tenseness, erythema and warmth)  - f/u procalcitonin, CRP, ID eval  - patient has left AC 18 gauge IV and was found to have empty heroin bags in his socks during examination, please monitor patient on unit at all times.    Active Polysubstance Use  Hx of Acute opioid withdrawals never hospitalized also used LSD the day of admission and admits to using Meth, Heroin, Adderall ocassionally  - Previously enrolled at [facility] methadone clinic, not active, previously on methadone and Suboxone in the past  Plan:  - f/u Addiction medicine consult: patient agreeable for inpatient detox  - Monitor COWS score for Opioid Withdrawals consider starting methadone taper   - Monitor IV access closely, GF and friend at bedside both admitted to polysubstance abuse with patient    Code: full  diet: regular  DVT ppx: lovenox      d/w attending, Dr. Iliana Landeros MD PGY1  spectra #[id number] 35 yo M with PMH active IVDU Heroin, Methamphetamine, LSD, Anxiety,  who presents to the ED via EMS after having a "bad trip" with LSD, meth, and Heroin use 6 hours PTA admitted for UE cellulitis r/o nec. fasc.    B/L Upper Extremity Cellulitis vs Lymphangitis  R/o necrotizing fascitis  - patient vitally stable, sepsis not present on admission, Lactate 1.3  - X-ray b/l,  forearms: Negative for bone abnormalities or subq emphysema  - warm compress, elevate forearm  - HIV neg-  - f/u Hep B/C given reported needle sharing  - Tylenol PRN for Pain  -c/w Unasyn and Vancomycin for MRSA coverage for now  -ESR 42  Plan:  - f/u CT Upper extremity w IV: r/o possible nec fascitis vs compartment syndrome (pt R>L forearm tenseness, erythema and warmth)  - f/u procalcitonin, CRP, ID eval  - patient has left AC 18 gauge IV and was found to have empty heroin bags in his socks during examination, please monitor patient on unit at all times.    Active Polysubstance Use  Hx of Acute opioid withdrawals never hospitalized also used LSD the day of admission and admits to using Meth, Heroin, Adderall ocassionally  - Previously enrolled at [facility] clinic, not active, previously on methadone and Suboxone in the past  Plan:  - f/u Addiction medicine consult: patient agreeable for inpatient detox  - Monitor COWS score for Opioid Withdrawals consider starting methadone taper   - Monitor IV access closely, GF and friend at bedside both admitted to polysubstance abuse with patient.  6/29: RN notified about empty heroin bags found on patient inside his socks during bedside exam. RN notified to keep close watch on patient as he has IV access. Monitor patient closely if he becomes unresponsive...consider Narcan.    Code: full  diet: regular  DVT ppx: lovenox      d/w attending, Dr. Iliana Landeros MD PGY1  spectra #[phone] 35 yo M with PMH active IVDU Heroin, Methamphetamine, LSD, Anxiety,  who presents to the ED via EMS after having a "bad trip" with LSD, meth, and Heroin use 6 hours PTA admitted for UE cellulitis r/o nec. fasc.    B/L Upper Extremity Cellulitis vs Lymphangitis  R/o necrotizing fascitis  - patient vitally stable, sepsis not present on admission, Lactate 1.3  - X-ray b/l,  forearms: Negative for bone abnormalities or subq emphysema  - warm compress, elevate forearm  - HIV neg-  - f/u Hep B/C given reported needle sharing  - Tylenol PRN for Pain  -c/w Unasyn and Vancomycin for MRSA coverage for now  -ESR 42  Plan:  Per ID: does not need CT and stop his ABX  - f/u procalcitonin, CRP, ID eval  - patient has left AC 18 gauge IV and was found to have empty heroin bags in his socks during examination, please monitor patient on unit at all times.    Active Polysubstance Use  Hx of Acute opioid withdrawals never hospitalized also used LSD the day of admission and admits to using Meth, Heroin, Adderall ocassionally.  - Previously enrolled at [facility] methadone clinic, not active, previously on methadone and Suboxone in the past  Plan:  - f/u Addiction medicine consult: patient agreeable for inpatient detox  - Monitor COWS score for Opioid Withdrawals consider starting methadone taper   - Monitor IV access closely, GF and friend at bedside both admitted to polysubstance abuse with patient.  6/29: RN notified about empty heroin bags found on patient inside his socks during bedside exam and patient seems overtly high on exam. RN notified to keep close watch on patient as he has IV access. Monitor patient closely if he becomes unresponsive...consider Narcan.  Per psych: constant Obs for suspected inpt herion use. D/c'd ativan. NO VISITORS    Code: full  diet: regular  DVT ppx: lovenox      d/w attending, Dr. Iliana Landeros MD PGY1  spectra #[phone] 37 yo M with PMH active IVDU Heroin, Methamphetamine, LSD, Anxiety,  who presents to the ED via EMS after having a "bad trip" with LSD, meth, and Heroin use 6 hours PTA admitted for UE cellulitis r/o nec. fasc.    B/L Upper Extremity Cellulitis vs Lymphangitis  R/o necrotizing fascitis  - patient vitally stable, sepsis not present on admission, Lactate 1.3  - X-ray b/l,  forearms: Negative for bone abnormalities or subq emphysema  - warm compress, elevate forearm  - HIV neg-  - f/u Hep B/C given reported needle sharing  - Tylenol PRN for Pain  -c/w Unasyn and Vancomycin for MRSA coverage for now  -ESR 42  Plan:  f/u UE duplex  Per ID: does not need CT and stop his ABX  - f/u procalcitonin, CRP, ID eval  - patient has left AC 18 gauge IV and was found to have empty heroin bags in his socks during examination, please monitor patient on unit at all times.    Active Polysubstance Use  Hx of Acute opioid withdrawals never hospitalized also used LSD the day of admission and admits to using Meth, Heroin, Adderall ocassionally.  - Previously enrolled at [facility] methadone [facility], not active, previously on methadone and Suboxone in the past  Plan:  - f/u Addiction medicine consult: patient agreeable for inpatient detox  - Monitor COWS score for Opioid Withdrawals consider starting methadone taper   - Monitor IV access closely, GF and friend at bedside both admitted to polysubstance abuse with patient.  6/29: RN notified about empty heroin bags found on patient inside his socks during bedside exam and patient seems overtly high on exam. RN notified to keep close watch on patient as he has IV access. Monitor patient closely if he becomes unresponsive...consider Narcan.  Per psych: constant Obs 1:1 for suspected inpt herion use. D/c'd ativan. NO VISITORS  Narcan PRN    Code: full  diet: regular  DVT ppx: lovenox      d/w attending, Dr. Barrientos (patient of Dr. Maynard)  Eric Landeros MD PGY1  spectra #[phone] 35 yo M with PMH active IVDU Heroin, Methamphetamine, LSD, Anxiety,  who presents to the ED via EMS after having a "bad trip" with LSD, meth, and Heroin use 6 hours PTA admitted for UE cellulitis r/o nec. fasc.    B/L Upper Extremity Cellulitis vs Lymphangitis  R/o necrotizing fascitis  - patient vitally stable, sepsis not present on admission, Lactate 1.3  - X-ray b/l,  forearms: Negative for bone abnormalities or subq emphysema  - warm compress, elevate forearm  - HIV neg-  - f/u Hep B/C given reported needle sharing  - Tylenol PRN for Pain  -c/w Unasyn and Vancomycin for MRSA coverage for now  -ESR 42  Plan:  f/u UE duplex  Per ID: does not need CT and stop his ABX  - f/u procalcitonin, CRP, ID eval  - patient has left AC 18 gauge IV and was found to have empty heroin bags in his socks during examination, please monitor patient on unit at all times.    Active Polysubstance Use  Hx of Acute opioid withdrawals never hospitalized also used LSD the day of admission and admits to using Meth, Heroin, Adderall ocassionally.  - Previously enrolled at [facility] methadone [facility], not active, previously on methadone and Suboxone in the past  Plan:  - f/u Addiction medicine consult: patient agreeable for inpatient detox  - Monitor COWS score for Opioid Withdrawals consider starting methadone taper   - Monitor IV access closely, GF and friend at bedside both admitted to polysubstance abuse with patient.  6/29: RN notified about empty heroin bags found on patient inside his socks during bedside exam and patient seems overtly high on exam. RN notified to keep close watch on patient as he has IV access. Monitor patient closely if he becomes unresponsive...consider Narcan.  Per psych: constant Obs 1:1 for suspected inpt herion use. consider d/c ativan NO VISITORS  Narcan PRN  Per Dr. Maynard will keep CIWA protocol and if CIWA >7 will do 2mg Ativan Q4 PRN based on CIWA    Code: full  diet: regular  DVT ppx: lovenox      d/w attending, Dr. Barrientos (patient of Dr. Maynard)  Eric Landeros MD PGY1  spectra #[phone] 37 yo M with PMH active IVDU Heroin, Methamphetamine, LSD, Anxiety,  who presents to the ED via EMS after having a "bad trip" with LSD, meth, and Heroin use 6 hours PTA admitted for UE cellulitis r/o nec. fasc.    B/L Upper Extremity Cellulitis vs Lymphangitis  R/o necrotizing fascitis  - patient vitally stable, sepsis not present on admission, Lactate 1.3  - X-ray b/l,  forearms: Negative for bone abnormalities or subq emphysema  - warm compress, elevate forearm  - HIV neg-  - f/u Hep B/C given reported needle sharing  - Tylenol PRN for Pain  -c/w Unasyn and Vancomycin for MRSA coverage for now  -ESR 42  Plan:  f/u UE duplex  Per ID: does not need CT and stop his ABX  - f/u procalcitonin, CRP, ID eval  - patient has left AC 18 gauge IV and was found to have empty heroin bags in his socks during examination, please monitor patient on unit at all times.    Active Polysubstance Use  Hx of Acute opioid withdrawals never hospitalized also used LSD the day of admission and admits to using Meth, Heroin, Adderall ocassionally.  - Previously enrolled at [facility] methadone [facility], not active, previously on methadone and Suboxone in the past  Plan:  - f/u Addiction medicine consult: patient agreeable for inpatient detox  - Monitor COWS score for Opioid Withdrawals consider starting methadone taper   - Monitor IV access closely, GF and friend at bedside both admitted to polysubstance abuse with patient.  6/29: RN notified about empty heroin bags found on patient inside his socks during bedside exam and patient seems overtly high on exam. RN notified to keep close watch on patient as he has IV access. Monitor patient closely if he becomes unresponsive...consider Narcan.  Per psych: constant Obs 1:1 for suspected inpt herion use. consider d/c ativan NO VISITORS  Narcan PRN  Per Dr. Maynard will keep CIWA protocol and if CIWA >7 will do 2mg Ativan Q4 PRN based on CIWA    Code: full  diet: regular  DVT ppx: lovenox      d/w attending, Dr. Barrientos and Dr. Aleyda Landeros MD PGY1  spectra #[phone] 37 yo M with PMH active IVDU Heroin, Methamphetamine, LSD, Anxiety,  who presents to the ED via EMS after having a "bad trip" with LSD, meth, and Heroin use 6 hours PTA admitted for UE cellulitis r/o nec. fasc.    B/L Upper Extremity Cellulitis vs Lymphangitis  R/o necrotizing fascitis  - patient vitally stable, sepsis not present on admission, Lactate 1.3  - X-ray b/l,  forearms: Negative for bone abnormalities or subq emphysema  - warm compress, elevate forearm  - HIV neg-  - f/u Hep B/C given reported needle sharing  - Tylenol PRN for Pain  -c/w Unasyn and Vancomycin for MRSA coverage for now  -ESR 42  Plan:  f/u UE duplex  Per ID: does not need CT and stop his ABX  - f/u procalcitonin, CRP, ID eval  - patient has left AC 18 gauge IV and was found to have empty heroin bags in his socks during examination, please monitor patient on unit at all times.    Active Polysubstance Use  Hx of Acute opioid withdrawals never hospitalized also used LSD the day of admission and admits to using Meth, Heroin, Adderall ocassionally.  - Previously enrolled at [facility] methadone [facility], not active, previously on methadone and Suboxone in the past  Plan:  - f/u Addiction medicine consult: patient agreeable for inpatient detox  - Monitor COWS score for Opioid Withdrawals consider starting methadone taper   - Monitor IV access closely, GF and friend at bedside both admitted to polysubstance abuse with patient.  6/29: RN notified about empty/partially full heroin bags found on patient inside his socks during bedside exam and patient seems overtly high on exam. Security notified and bags collect by them. RN notified to keep close watch on patient as he has IV access. Monitor patient closely if he becomes unresponsive...consider Narcan.  Per psych: constant Obs 1:1 for suspected inpt herion use. consider d/c ativan NO VISITORS  Narcan PRN  Per Dr. Maynard will keep CIWA protocol and if CIWA >7 will do 2mg Ativan Q4 PRN based on CIWA    Code: full  diet: regular  DVT ppx: lovenox      d/w attending, Dr. Barrientos and Dr. Aleyda Landeros MD PGY1  spectra #[phone]

## 2021-06-29 NOTE — PROGRESS NOTE ADULT - ATTENDING COMMENTS
Mr Zaragoza is a 36 year old man with a history of Anxiety, depression and polysubstance dependence who was admitted to the medical floor for the management of upper extremity cellulitis.   Addiction consult was called for the management of Opioid Dependence.   It appears that patient used heroin while on the medical floor and did not receive the first dose of the methadone taper yesterday. The heroin in patient's possession has however been removed from patient's possession. Patient reports understanding the potential  consequence of his actions and continues to report having the despite to attain sobriety.   Patient denies any acute signs or symptoms of psychosis , yumiko or major depression. He denies current suicidal ideations, intent or plan.   At this time, patient is not considered an imminent danger to himself or others and does not need inpatient psychiatric hospitalization. We recommend continuing the methadone taper until we can confirm outpatient follow up at a methadone program.  The CATCH team social workers and counsellors will continue to work on disposition to a methadone program.

## 2021-06-29 NOTE — CONSULT NOTE ADULT - CONSULT REASON
35 yo M with PMH active IVDU Heroin, Methamphetamine, LSD, Anxiety, Depression who presents to the ED via EMS after having a "bad trip" with LSD, meth, and Heroin use admitted for b/l cellulitis wants to participate in drug rehab
cellulitis

## 2021-06-29 NOTE — CONSULT NOTE ADULT - SUBJECTIVE AND OBJECTIVE BOX
KEATON MCGHEE  36y, Male  Allergy: No Known Allergies      All historical available data reviewed.    HPI:  35 yo M with PMH active IVDU Heroin, Methamphetamine, LSD, Anxiety, Depression who presents to the ED via EMS after having a "bad trip" with LSD, meth, and Heroin use 6 hours prior to arrival. Patient was brought into ED, after girlfriend activated EMS. He reports that he took LSD around 11 pm, followed by heroin around 12 am, and roughly about an hour later he started feeling extremely anxious and with an outer body experience. He states that this has never happened to him before and his gf noted him to be more erratic, breathing heavily and repeating "I feel like im going to die, my hands are falling off. He was evaluated in the ED, found to have b/l UE track marks with arm swelling, erythema, and tenderness. Upon interview, patient is back to baseline mental status, AAOx3, and states that he took drugs for recreational use and denies any suicidal ideations. He endorses that his b/l arm swelling started a week ago after he returned from trip to [place], where he admits to sharing needles. He endorses acute muscle tenderness b/l arms R>L, rated 7/10 in severity and described as sharp and constant,  but otherwise denies fevers or chills.  He states that he gets his drugs from the same dealer and does not believe it was laced with anything. He otherwise denies any chest pain, sob, nausea, vomiting, dizziness or abdominal discomfort.     Ed course: BP: 128/78, HR: 100, T: 98.8, RR: 16, SPO2 98%. EKG with no ischemic changes, NSR, no leukocytosis, no electrolyte imbalances. Lactate 1.3, s/p bedside Upper extremity US in ED with + cobblestoning, agreeable for inpatient detox (28 Jun 2021 09:19)    FAMILY HISTORY:    PAST MEDICAL & SURGICAL HISTORY:  Substance abuse    No significant past surgical history          VITALS:  T(F): 97.5, Max: 97.5 (06-28-21 @ 20:10)  HR: 74  BP: 105/59  RR: 17Vital Signs Last 24 Hrs  T(C): 36.4 (29 Jun 2021 05:00), Max: 36.4 (28 Jun 2021 20:10)  T(F): 97.5 (29 Jun 2021 05:00), Max: 97.5 (28 Jun 2021 20:10)  HR: 74 (29 Jun 2021 05:00) (74 - 83)  BP: 105/59 (29 Jun 2021 05:00) (105/59 - 125/70)  BP(mean): --  RR: 17 (29 Jun 2021 05:00) (17 - 18)  SpO2: --    TESTS & MEASUREMENTS:                        11.4   5.32  )-----------( 234      ( 29 Jun 2021 05:42 )             35.1     06-29    139  |  102  |  10  ----------------------------<  118<H>  4.2   |  29  |  0.7    Ca    9.0      29 Jun 2021 05:42  Phos  4.1     06-29  Mg     2.0     06-29    TPro  6.6  /  Alb  3.7  /  TBili  <0.2  /  DBili  x   /  AST  16  /  ALT  12  /  AlkPhos  100  06-29    LIVER FUNCTIONS - ( 29 Jun 2021 05:42 )  Alb: 3.7 g/dL / Pro: 6.6 g/dL / ALK PHOS: 100 U/L / ALT: 12 U/L / AST: 16 U/L / GGT: x                   RADIOLOGY & ADDITIONAL TESTS:  Personal review of radiological diagnostics performed  Echo and EKG results noted when applicable.     MEDICATIONS:  acetaminophen   Tablet .. 650 milliGRAM(s) Oral every 6 hours PRN  ampicillin/sulbactam  IVPB 1.5 Gram(s) IV Intermittent every 6 hours  enoxaparin Injectable 40 milliGRAM(s) SubCutaneous at bedtime  famotidine Injectable 20 milliGRAM(s) IV Push once  loperamide 2 milliGRAM(s) Oral every 6 hours PRN  LORazepam   Injectable 2 milliGRAM(s) IV Push every 4 hours PRN  methadone    Tablet 15 milliGRAM(s) Oral every 12 hours  ondansetron Injectable 4 milliGRAM(s) IV Push every 6 hours PRN  prochlorperazine   Tablet 10 milliGRAM(s) Oral every 8 hours PRN  vancomycin  IVPB 1000 milliGRAM(s) IV Intermittent every 12 hours      ANTIBIOTICS:  ampicillin/sulbactam  IVPB 1.5 Gram(s) IV Intermittent every 6 hours  vancomycin  IVPB 1000 milliGRAM(s) IV Intermittent every 12 hours

## 2021-06-29 NOTE — PROGRESS NOTE ADULT - SUBJECTIVE AND OBJECTIVE BOX
HPI  Patient is a 36y old Male who presents with a chief complaint of Cellulitis (29 Jun 2021 07:54)    Currently admitted to medicine with the primary diagnosis of Cellulitis.        Today is hospital day 1d.     INTERVAL HPI / OVERNIGHT EVENTS:  Patient was examined and seen at bedside. This morning he is resting comfortably in bed and reports no new issues or overnight events.  Patient denies any symptoms of opiate withdrawal.  He denies diarrhea, sweating, piloerection, anxiety, nausea vomiting, tachycardia.  Patient states the last time he used was yesterday before hospital admission.  Denies using in the hospital.  Denies symptoms of benzo withdrawal.  He endorses good response to methadone this morning and wants to continue with taper and eventual referral to [facility].      Per nursing report, patient was given first methadone dose today at 9am.  He was also given ativan last night for agitation.      Per medical resident, patient was found to have a bag of heroin in his sock; this was turned into security.  Patient has denied using heroin on the unit or other substances however has been very sedated today with no complaints /signs of opiate withdrawal.  Resident states the patient has visitation restrictions and will followup on getting the patient a 1:1 sit.      ROS: All systems negative except as stated in HPI    PAST MEDICAL & SURGICAL HISTORY  Substance abuse    No significant past surgical history      ALLERGIES  No Known Allergies    MEDICATIONS  STANDING MEDICATIONS  ampicillin/sulbactam  IVPB 1.5 Gram(s) IV Intermittent every 6 hours  enoxaparin Injectable 40 milliGRAM(s) SubCutaneous at bedtime  famotidine Injectable 20 milliGRAM(s) IV Push once  methadone    Tablet 15 milliGRAM(s) Oral every 12 hours  vancomycin  IVPB 1000 milliGRAM(s) IV Intermittent every 12 hours    PRN MEDICATIONS  acetaminophen   Tablet .. 650 milliGRAM(s) Oral every 6 hours PRN  loperamide 2 milliGRAM(s) Oral every 6 hours PRN  ondansetron Injectable 4 milliGRAM(s) IV Push every 6 hours PRN  prochlorperazine   Tablet 10 milliGRAM(s) Oral every 8 hours PRN    VITALS:  T(F): 97.5  HR: 74  BP: 105/59  RR: 17  SpO2: --    PHYSICAL EXAM  GEN: +sedated.  NAD, Resting comfortably in bed  NEURO: A&Ox3, no focal deficits, pinpoint pupils   SKIN: no evidence of piloerection or sweating.  + bilateral UE injection marks with bilateral demarcated erythema with the appearance of cellulitis     COWS: 0  CIWA: 0    LABS                        11.4   5.32  )-----------( 234      ( 29 Jun 2021 05:42 )             35.1     06-29    139  |  102  |  10  ----------------------------<  118<H>  4.2   |  29  |  0.7    Ca    9.0      29 Jun 2021 05:42  Phos  4.1     06-29  Mg     2.0     06-29    TPro  6.6  /  Alb  3.7  /  TBili  <0.2  /  DBili  x   /  AST  16  /  ALT  12  /  AlkPhos  100  06-29          Sedimentation Rate, Erythrocyte: 42 mm/Hr *H* (06-28-21 @ 17:14)          RADIOLOGY

## 2021-06-29 NOTE — PROGRESS NOTE ADULT - ASSESSMENT
37 yo M domiciled with girlfriend unemployed, with PPHx of anxiety and depression, no IPP, no SAs, has hx of active IVDU Heroin, Methamphetamine, LSD, who presented to the ED via EMS after having a "bad trip" with LSD, meth, and Heroin use 6 hours prior to arrival.  Was admitted for bilateral UE cellulitis related to IVDU.      Upon evaluation, patient is wanting to start Methadone maintenance therapy and wants referral to [facility] upon discharge.  He is in agreement to continue methadone taper until he is approved by [facility] at which point can start 40mg/day while in the hospital.  Patient is motivated for change and understands the risks and benefits of methadone therapy.  Patient is psychiatrically stable and not endorsing manic, anxious, psychotic or paranoid symptoms.  Patient endorses today that he last used 20 bags of heroin on 6/28 in the morning and denies using in the hospital.  bag of heroin was found in his sock this morning (turned into security), however patient denies using while on the unit.  Patient received first dose of methadone (15mg) this morning at 9am, he denies signs or symptoms of opiate withdrawal; his COWS is 0.  Patient appears sedated and falls asleep intermittently throughout the interview.  Patient has not taken alprazolam in 3 days and denies has no signs/symptoms of benzo withdrawal; has never had severe withdrawals or seizures.  Patient has not been agitated today but received ativan last night at 10pm for agitation.  Recommend to discontinue ativan due to sedation.    Recommendations:  - c/w methadone taper (severe) for opiate withdrawals  - Monitor patient closely, recommend nursing 1:1. Patient should not have visitors and should not leave the hospital building.    - d/c ativan protocol as the patient is over-sedated and has not experienced alcohol or benzo withdrawals recently or in the past.  He does not drink alcohol and has no history of benzo dependence.    - Will followup in referral to [facility] and consider starting standing methadone in the hospital, pending approval  - Can give PRNs for symptomatic management of opiate withdrawals if there is no contraindication: clonidine for restlessness/sweating/palpitations, Tylenol for joint pains, imodium for diarrhea, Zofran for diarrhea.    - CATCH team  will followup  - Addiction team will follow for response to methadone and pending referral to [facility]

## 2021-06-30 LAB
HAV IGM SER-ACNC: SIGNIFICANT CHANGE UP
HBV CORE IGM SER-ACNC: SIGNIFICANT CHANGE UP
HBV SURFACE AG SER-ACNC: SIGNIFICANT CHANGE UP
HCV AB S/CO SERPL IA: 0.23 S/CO — SIGNIFICANT CHANGE UP (ref 0–0.99)
HCV AB SERPL-IMP: SIGNIFICANT CHANGE UP

## 2021-07-03 LAB
CULTURE RESULTS: SIGNIFICANT CHANGE UP
CULTURE RESULTS: SIGNIFICANT CHANGE UP
SPECIMEN SOURCE: SIGNIFICANT CHANGE UP
SPECIMEN SOURCE: SIGNIFICANT CHANGE UP

## 2021-07-08 DIAGNOSIS — M60.9 MYOSITIS, UNSPECIFIED: ICD-10-CM

## 2021-07-08 DIAGNOSIS — F90.9 ATTENTION-DEFICIT HYPERACTIVITY DISORDER, UNSPECIFIED TYPE: ICD-10-CM

## 2021-07-08 DIAGNOSIS — F32.9 MAJOR DEPRESSIVE DISORDER, SINGLE EPISODE, UNSPECIFIED: ICD-10-CM

## 2021-07-08 DIAGNOSIS — F16.10 HALLUCINOGEN ABUSE, UNCOMPLICATED: ICD-10-CM

## 2021-07-08 DIAGNOSIS — F11.20 OPIOID DEPENDENCE, UNCOMPLICATED: ICD-10-CM

## 2021-07-08 DIAGNOSIS — R41.82 ALTERED MENTAL STATUS, UNSPECIFIED: ICD-10-CM

## 2021-07-08 DIAGNOSIS — Z53.29 PROCEDURE AND TREATMENT NOT CARRIED OUT BECAUSE OF PATIENT'S DECISION FOR OTHER REASONS: ICD-10-CM

## 2021-07-08 DIAGNOSIS — F41.9 ANXIETY DISORDER, UNSPECIFIED: ICD-10-CM

## 2021-07-08 DIAGNOSIS — F17.210 NICOTINE DEPENDENCE, CIGARETTES, UNCOMPLICATED: ICD-10-CM

## 2021-07-08 DIAGNOSIS — E87.5 HYPERKALEMIA: ICD-10-CM

## 2021-10-05 NOTE — ED ADULT TRIAGE NOTE - PAIN: PRESENCE, MLM
denies pain/discomfort Sski Pregnancy And Lactation Text: This medication is Pregnancy Category D and isn't considered safe during pregnancy. It is excreted in breast milk.

## 2021-10-26 NOTE — ED PROVIDER NOTE - PENDING LAB RAD OPT OUT
Exclude Pending Lab and Radiology orders from printing on the Patient's Discharge Instructions, due to Privacy Concerns. Tissue Cultured Epidermal Autograft Text: The defect edges were debeveled with a #15 scalpel blade.  Given the location of the defect, shape of the defect and the proximity to free margins a tissue cultured epidermal autograft was deemed most appropriate.  The graft was then trimmed to fit the size of the defect.  The graft was then placed in the primary defect and oriented appropriately.

## 2022-03-30 NOTE — ED ADULT NURSE NOTE - TEMPLATE
Reached out to Lalita to see if it might be possible to move her father's surgery up to this Friday.  She will check and call back.      Her dad may call - unable to switch.  We will keep surgery on the 14th of April.  With est arrival at 5:30  @ [facility]    Jose called to say not able to change to this Friday.     Reached out and left a VM at 11:30 a.m. for Jose on his cell to let him know that we will keep surgery on 4/14 and his Daughter Lalita also knows this.  
General

## 2023-10-30 NOTE — CONSULT NOTE ADULT - SKIN COMMENTS
Patient seen in anticoagulation clinic with her  Aparna. Reviewed past INR and dose with patient and spouse.  Patient denies any missed doses of warfarin or medication changes.  Diet and activity consistent.  Reviewed INR goal.  INR 2.9.  Will adjust dose of warfarin and recheck in 3 days.  Reminded  to call office with any changes.  INR and dose per Dr Luna Grande's protocol.  Onsite provider Dr Akash Baron.  
forearms b/l with edema, no drainage. Faint erythema LUE medially. Good distal pulses

## 2024-02-27 ENCOUNTER — EMERGENCY (EMERGENCY)
Facility: HOSPITAL | Age: 39
LOS: 0 days | Discharge: HOME | End: 2024-02-27
Attending: EMERGENCY MEDICINE
Payer: MEDICAID

## 2024-02-27 VITALS
WEIGHT: 149.91 LBS | OXYGEN SATURATION: 100 % | TEMPERATURE: 98 F | RESPIRATION RATE: 17 BRPM | HEART RATE: 85 BPM | DIASTOLIC BLOOD PRESSURE: 82 MMHG | SYSTOLIC BLOOD PRESSURE: 121 MMHG

## 2024-02-27 VITALS
SYSTOLIC BLOOD PRESSURE: 119 MMHG | TEMPERATURE: 98 F | HEART RATE: 89 BPM | OXYGEN SATURATION: 100 % | DIASTOLIC BLOOD PRESSURE: 71 MMHG | RESPIRATION RATE: 17 BRPM

## 2024-02-27 DIAGNOSIS — F32.A DEPRESSION, UNSPECIFIED: ICD-10-CM
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
## 2024-02-27 DIAGNOSIS — S02.40DA MAXILLARY FRACTURE, LEFT SIDE, INITIAL ENCOUNTER FOR CLOSED FRACTURE: ICD-10-CM
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
## 2024-02-27 DIAGNOSIS — S02.32XA FRACTURE OF ORBITAL FLOOR, LEFT SIDE, INITIAL ENCOUNTER FOR CLOSED FRACTURE: ICD-10-CM

## 2024-02-27 DIAGNOSIS — F41.9 ANXIETY DISORDER, UNSPECIFIED: ICD-10-CM

## 2024-02-27 DIAGNOSIS — R51.9 HEADACHE, UNSPECIFIED: ICD-10-CM

## 2024-02-27 PROCEDURE — 96372 THER/PROPH/DIAG INJ SC/IM: CPT

## 2024-02-27 PROCEDURE — 99284 EMERGENCY DEPT VISIT MOD MDM: CPT | Mod: 25

## 2024-02-27 PROCEDURE — 70486 CT MAXILLOFACIAL W/O DYE: CPT | Mod: 26,MC

## 2024-02-27 PROCEDURE — 99284 EMERGENCY DEPT VISIT MOD MDM: CPT

## 2024-02-27 PROCEDURE — 99284 EMERGENCY DEPT VISIT MOD MDM: CPT | Mod: GC

## 2024-02-27 PROCEDURE — 70486 CT MAXILLOFACIAL W/O DYE: CPT | Mod: MC

## 2024-02-27 RX ORDER — OXYMETAZOLINE HYDROCHLORIDE 0.5 MG/ML
2 SPRAY NASAL
Qty: 1 | Refills: 0
Start: 2024-02-27 | End: 2024-02-29

## 2024-02-27 RX ORDER — CEPHALEXIN 500 MG
500 CAPSULE ORAL ONCE
Refills: 0 | Status: COMPLETED | OUTPATIENT
Start: 2024-02-27 | End: 2024-02-27

## 2024-02-27 RX ORDER — KETOROLAC TROMETHAMINE 30 MG/ML
30 SYRINGE (ML) INJECTION ONCE
Refills: 0 | Status: DISCONTINUED | OUTPATIENT
Start: 2024-02-27 | End: 2024-02-27

## 2024-02-27 RX ORDER — BUPRENORPHINE AND NALOXONE 2; .5 MG/1; MG/1
1 TABLET SUBLINGUAL ONCE
Refills: 0 | Status: DISCONTINUED | OUTPATIENT
Start: 2024-02-27 | End: 2024-02-27

## 2024-02-27 RX ADMIN — BUPRENORPHINE AND NALOXONE 1 TABLET(S): 2; .5 TABLET SUBLINGUAL at 18:53

## 2024-02-27 RX ADMIN — Medication 30 MILLIGRAM(S): at 14:54

## 2024-02-27 RX ADMIN — Medication 500 MILLIGRAM(S): at 16:31

## 2024-02-27 NOTE — ED PROVIDER NOTE - NSFOLLOWUPINSTRUCTIONS_ED_ALL_ED_FT
TAKE ANTIBIOTICS AS PRESCRIBED FOR THE NEXT SEVEN DAYS. DO NOT USE AFRIN SPRAY FOR MORE THAN THREE DAYS.    Orbital Fracture  Front, or anterior, view of the skull inside a person's head showing the orbital roof and orbital floor.  An orbital fracture is a break in the orbit or eye socket, which is the bony structure that protects the eye. This fracture usually causes swelling and pain, and it may affect vision.    There are three main types of orbital fracture:  Orbital roof fracture. This is a break at the top part of the orbit.  Orbital rim fracture. This is a break at the outer edge of the orbit.  Orbital floor fracture. This is a break at the bottom part of the orbit.  An orbital floor fracture with entrapment is when muscle or tissue, or both, get trapped inside of an orbital fracture. Vision is often affected. Orbital floor fracture with entrapment may also be called a trapdoor fracture. This fracture is more commonly seen in children and may cause a nervous system reaction that requires treatment right away because of irregular pulse rates and blood pressure levels.    What are the causes?  This condition is caused by an injury to your eye, such as a hard, direct hit. Causes include:  Sports injuries, such as a hard compact ball or bat striking the eye.  Falls during running or bicycling.  Assaults or combat sports where trauma to the face occurs.  Motor vehicle accidents in which the face hits the dashboard or another hard object inside the vehicle.  What are the signs or symptoms?  Two eyes. One eye is normal. The other eye has swelling and bruising around it.  Symptoms of this condition include:  Swelling and bruising around your eye.  Pain or tenderness around your eye.  Difficulty looking up, down, or side to side.  Changes in vision, such as blurry vision or double vision.  The injured eye looking sunken compared with the other eye (enophthalmos).  The injured eye bulging (exophthalmos), in severe cases of swelling.  Inability to gaze upwards with the affected eye.  Numbness of the cheek, inner nose, and upper gum on the side of the face that has the injury.  How is this diagnosed?  This condition may be diagnosed based on:  Your symptoms and medical history, especially any history of trauma.  An eye exam. This involves checking visual clarity (acuity), feeling the orbital area with the hand, and checking other structures of the face.  An X-ray or a CT scan.  How is this treated?  Treatment for this condition depends on the severity and type of fracture.    For small fractures, surgery may not be needed. The broken bone heals on its own with time. To help manage symptoms, treatment may include:  Applying ice to the injured area.  Pain medicines.  Antibiotic medicine to treat or prevent infection.  Steroids to reduce swelling.  Scheduling a follow-up visit with an eye specialist (ophthalmologist).  If there is entrapment, treatment is usually started after all swelling around the eye has gone away, which may take 1–2 weeks. After swelling goes away, an ophthalmologist may try to free the entrapped tissue if you still have double vision. If this cannot be done, you may need surgery.    If you have double vision only when looking up, your health care provider will discuss treatment options with you. Some people who do not spend a lot of time looking up choose to go without more treatment. Others who need to look up often for work need treatment.    If you have any of these symptoms, you may need emergency surgery:  Nausea or vomiting.  A slow heart rate.  Dizziness.  Loss of consciousness.  Severe pain or worsening bulging of the eye.  Complete loss of vision in the injured eye. This may indicate optic nerve damage.  Follow these instructions at home:  Medicines    Take over-the-counter and prescription medicines only as told by your health care provider.  If you were prescribed an antibiotic medicine, take it as told by your health care provider. Do not stop taking the antibiotic even if you start to feel better.  Managing pain, bruising, and swelling    A bag of ice on a towel on the skin.   If directed, put ice on the injured eye. To do this:  Put ice in a plastic bag.  Place a towel between your skin and the bag.  Leave the ice on for 20 minutes, 2–3 times a day.  Remove the ice if your skin turns bright red. This is very important. If you cannot feel pain, heat, or cold, you have a greater risk of damage to the area.  If recommended by your health care provider, put one or two extra pillows under your head when you are lying down.  Activity    Do not drive or operate machinery until your health care provider says that it is safe. Be aware that if you are using only one eye to see, you may have trouble judging distances (depth perception). Ask your health care provider when it is safe to drive.  Avoid traveling by plane or going to high-altitude areas. This may slow the healing of your swelling and may increase sinus pain.  Do not lift anything that is heavier than 10 lb (4.5 kg), or the limit that you are told, until your health care provider says that it is safe.  Return to your normal activities as told by your health care provider. Ask your health care provider what activities are safe for you.  Follow instructions from your health care provider about wearing protective glasses or goggles for work.  General instructions    Do not touch, rub, or try to move your eye.  Do not blow your nose until your health care provider says that you can.  Do not put a contact lens in the injured eye until your health care provider says that you can.  Stay away from mckenna areas.  Do not use any products that contain nicotine or tobacco. These products include cigarettes, chewing tobacco, and vaping devices, such as e-cigarettes. If you need help quitting, ask your health care provider.  Do not take baths, swim, or use a hot tub until your health care provider approves. Ask your health care provider if you may take showers. You may only be allowed to take sponge baths.  Keep all follow-up visits. This is important.  Contact a health care provider if:  You have vision changes, such as increased blurriness or worsening double vision.  You feel pain when you move your eyes.  You feel nauseous or light-headed when you move your eyes.  You have redness or swelling that does not go away or gets worse.  You have blood or fluid coming from your nose.  You have a fever or a headache.  Get help right away if:  You have a sensation of seeing flashing lights.  You have sudden blindness.  You have sudden bulging of the injured eye.  Your heart is beating much more slowly than normal.  You feel dizzy or you faint.  You have chest pain.  You are short of breath.  These symptoms may represent a serious problem that is an emergency. Do not wait to see if the symptoms will go away. Get medical help right away. Call your local emergency services (911 in the [place] Do not drive yourself to the hospital.    Summary  An orbital fracture is a break in the orbit or eye socket, which is the bony structure that protects the eye.  This condition usually causes swelling and pain around the injured eye, and you may have bruising or vision loss. You may also have trouble looking up, down, or side to side.  Treatment depends on the severity and type of fracture and if there are any serious vision problems. Often, the only treatment is waiting until the swelling goes down. More serious symptoms may indicate the need for emergency surgery.  You should notdrive until your health care provider says that it is safe. Return to your normal activities as told by your health care provider. It is important to keep all follow-up visits.  This information is not intended to replace advice given to you by your health care provider. Make sure you discuss any questions you have with your health care provider.

## 2024-02-27 NOTE — ED ADULT NURSE NOTE - NSFALLHARMRISKINTERV_ED_ALL_ED
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
## 2024-02-27 NOTE — ED PROVIDER NOTE - PATIENT PORTAL LINK FT
You can access the FollowMyHealth Patient Portal offered by [facility] by registering at the following website: [email or web address]. By joining [facility]’s FollowMyHealth portal, you will also be able to view your health information using other applications (apps) compatible with our system.
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
## 2024-02-27 NOTE — ED PROVIDER NOTE - PHYSICAL EXAMINATION
CONSTITUTIONAL: Well-developed; well-nourished; in no acute distress.   SKIN: Warm, dry  HEAD: Normocephalic; no archuleta sign  EYES: PERRL, EOMI, normal sclera and conjunctiva. Left upper and lower eyelid edema and ecchymosis; no corneal abrasion or Michael sign on staining. No periorbital crepitus. 1.5 cm linear laceration inferior to orbit.  ENT: No nasal discharge; airway clear. Normal TM b/l no hemotympanum. No oral trauma  NECK: Supple; non tender.  CARD:  Regular rate and rhythm. Normal S1, S2  RESP: No increased WOB. CTA b/l without wheezes, crackles, rhonchi  ABD: Soft, nontender, nondistended.  EXT: Normal ROM. No deformities or tenderness.   NEURO: Alert, oriented, grossly unremarkable

## 2024-02-27 NOTE — ED PROVIDER NOTE - CLINICAL SUMMARY MEDICAL DECISION MAKING FREE TEXT BOX
Orbital wall fracture. No evidence of entrapment. Cleared by OMFS for discharge. Discharged on antibiotics, this was discussed with FUAD.
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
## 2024-02-28 NOTE — ED PROVIDER NOTE - PROGRESS NOTE DETAILS
NURSING ASSESSMENT     Date: 2/28/2024        Patient Name: Radu Suarez     YOB: 1951      Age:  72 y.o.      MRN: [id number]       Chaperone [] Yes   [x] No      Advance Directives:   Do you currently have completed advance directives (living will)?  XYes   [] No         *If yes, please bring us a copy for your records.  *If no, would you like info or assistance in completing advance directives (living will)?   [] Yes   [x] No    Pain Score:   Pain Score (1-10): Denies   Pain Location: NA   Pain Duration: NA   Pain Management/Control: NA      Is pain affecting your ability to take care of yourself or move throughout your home?                        [] Yes   [x] No    General: No Problems  Patient has gained weight [x] Yes   [] No  Patient has lost weight [] Yes   [x] No  How much weight in pounds and over what length of time: Up 2-4 lbs since last visit    Eyes (Ophthalmic): Wears glasses     Skin (Dermatological): No Problems     ENT: Chronic runny nose     Respiratory: HANSON at times with walking     Cardiovascular: No Problems-Stress done in the 11/2023, which was good      Device   [] Yes   [x] No   Copy of Card Obtained [] Yes   [x] No    Gastrointestinal: h/o hemorrhoids with occasional flare ups of blood and itchiness, but no flare up currently    Genito-Urinary: No Problems     Breast: No Problems     Musculoskeletal: Joint Pain to knees and hips at times    Neurological: No Problems      Hematological and Lymphatic: Easy Bruising-takes Effient and baby asa     Endocrine: No Problems       A 10-point review of systems  has been conducted and pertinent positives have been   recorded. All other review of systems are negative    Was the patient admitted during the course of treatment OR within 30 days of treatment? None        Additional Comments: Scheduled for FM/SOG on 3/8/24 at 1:15 pm                          Belly soft and nontender.  Bedside US shows no signs of cholecystitis.  Pt denies any Acetaminophen use or mushroom ingestion.  Denies alcohol consumption.  Unclear etiology of transaminitis.  Elevated CK and myalgias noted.  Will add on hepatitis panel and acetaminophen level.  Dr. Malone aware and will f/u.

## 2024-11-07 ENCOUNTER — EMERGENCY (EMERGENCY)
Facility: HOSPITAL | Age: 39
LOS: 0 days | Discharge: HOME | End: 2024-11-07
Attending: STUDENT IN AN ORGANIZED HEALTH CARE EDUCATION/TRAINING PROGRAM
Payer: MEDICAID

## 2024-11-07 VITALS
HEART RATE: 77 BPM | DIASTOLIC BLOOD PRESSURE: 94 MMHG | SYSTOLIC BLOOD PRESSURE: 134 MMHG | TEMPERATURE: 98 F | OXYGEN SATURATION: 100 % | RESPIRATION RATE: 15 BRPM | WEIGHT: 134.92 LBS

## 2024-11-07 VITALS — SYSTOLIC BLOOD PRESSURE: 108 MMHG | DIASTOLIC BLOOD PRESSURE: 71 MMHG

## 2024-11-07 DIAGNOSIS — Z03.6 ENCOUNTER FOR OBSERVATION FOR SUSPECTED TOXIC EFFECT FROM INGESTED SUBSTANCE RULED OUT: ICD-10-CM

## 2024-11-07 DIAGNOSIS — T40.1X1A POISONING BY HEROIN, ACCIDENTAL (UNINTENTIONAL), INITIAL ENCOUNTER: ICD-10-CM

## 2024-11-07 DIAGNOSIS — R40.0 SOMNOLENCE: ICD-10-CM

## 2024-11-07 DIAGNOSIS — F11.10 OPIOID ABUSE, UNCOMPLICATED: ICD-10-CM

## 2024-11-07 PROCEDURE — 82962 GLUCOSE BLOOD TEST: CPT

## 2024-11-07 PROCEDURE — 99283 EMERGENCY DEPT VISIT LOW MDM: CPT

## 2024-11-07 PROCEDURE — 99283 EMERGENCY DEPT VISIT LOW MDM: CPT | Mod: 25

## 2024-11-07 PROCEDURE — 99408 AUDIT/DAST 15-30 MIN: CPT

## 2024-12-20 NOTE — H&P ADULT - CONSTITUTIONAL
[FreeTextEntry1] : Patient is medically optimized for the procedure. Will proceed. Well-developed, well nourished